# Patient Record
Sex: FEMALE | Race: BLACK OR AFRICAN AMERICAN | Employment: OTHER | URBAN - METROPOLITAN AREA
[De-identification: names, ages, dates, MRNs, and addresses within clinical notes are randomized per-mention and may not be internally consistent; named-entity substitution may affect disease eponyms.]

---

## 2021-08-05 ENCOUNTER — OFFICE VISIT (OUTPATIENT)
Dept: FAMILY MEDICINE CLINIC | Age: 66
End: 2021-08-05
Payer: COMMERCIAL

## 2021-08-05 VITALS
TEMPERATURE: 98.7 F | HEIGHT: 65 IN | HEART RATE: 77 BPM | WEIGHT: 179 LBS | BODY MASS INDEX: 29.82 KG/M2 | DIASTOLIC BLOOD PRESSURE: 75 MMHG | SYSTOLIC BLOOD PRESSURE: 124 MMHG

## 2021-08-05 DIAGNOSIS — Z00.00 ENCOUNTER FOR MEDICAL EXAMINATION TO ESTABLISH CARE: ICD-10-CM

## 2021-08-05 DIAGNOSIS — M75.41 IMPINGEMENT SYNDROME OF RIGHT SHOULDER: ICD-10-CM

## 2021-08-05 DIAGNOSIS — E11.9 TYPE 2 DIABETES MELLITUS WITHOUT COMPLICATION, WITHOUT LONG-TERM CURRENT USE OF INSULIN (HCC): ICD-10-CM

## 2021-08-05 DIAGNOSIS — I10 ESSENTIAL HYPERTENSION: Primary | ICD-10-CM

## 2021-08-05 PROBLEM — I89.0 LYMPHEDEMA OF LEFT ARM: Status: ACTIVE | Noted: 2017-02-15

## 2021-08-05 PROBLEM — C80.1 MALIGNANT NEOPLASM (HCC): Status: ACTIVE | Noted: 2021-08-05

## 2021-08-05 PROBLEM — R74.8 ELEVATED LIVER ENZYMES: Status: ACTIVE | Noted: 2017-02-15

## 2021-08-05 PROBLEM — M19.90 OSTEOARTHROSIS: Status: ACTIVE | Noted: 2021-08-05

## 2021-08-05 PROCEDURE — 99203 OFFICE O/P NEW LOW 30 MIN: CPT | Performed by: STUDENT IN AN ORGANIZED HEALTH CARE EDUCATION/TRAINING PROGRAM

## 2021-08-05 RX ORDER — METFORMIN HYDROCHLORIDE 500 MG/1
500 TABLET ORAL 2 TIMES DAILY
COMMUNITY

## 2021-08-05 RX ORDER — LOSARTAN POTASSIUM 50 MG/1
50 TABLET ORAL DAILY
COMMUNITY

## 2021-08-05 RX ORDER — LANCETS 33 GAUGE
EACH MISCELLANEOUS
COMMUNITY
Start: 2021-07-20

## 2021-08-05 RX ORDER — ATORVASTATIN CALCIUM 10 MG/1
10 TABLET, FILM COATED ORAL DAILY
COMMUNITY

## 2021-08-05 NOTE — PROGRESS NOTES
[address] [place] [address]   Office [phone]  Fax [phone]         Stefania Camp is an 61 y.o. female who presents to [facility] care. Patient was previously receiving care in [place], now moved to [place]. Currently no acute complaints     DM: Following with [facility]. On Metformin 500mg BID    HTN: On Losartan 50mg daily. R shoulder impingement syndrome: Reports pain started slowly while driving form [place], but has stayed constant since then. Aggravated by reaching above head, behind back and to the side. Evaluated by PT and would like a referral for physical therapy. Reviewed PT note from [address] [place] [address] on 6/24/21. Gyn Care  Mammogram 12/2020 - needs yearly       Current medications include:   Current Outpatient Medications   Medication Sig    metFORMIN (GLUCOPHAGE) 500 mg tablet 500 mg two (2) times a day.  atorvastatin (LIPITOR) 10 mg tablet 10 mg daily.  losartan (COZAAR) 50 mg tablet 50 mg daily.  One Touch Delica 33 gauge misc      No current facility-administered medications for this visit. Past Medical History - reviewed:  Medical history significant for:   Past Medical History:   Diagnosis Date    Cancer [facility])     breast cancer left, 2011. . S/p lumpectomy/RT/Tamoxifen x6 years    Depression     H/O colonoscopy 04/2019    Diverticulosis + benign polyps. Rpt 5 years     H/O echocardiogram 12/2020    LVEF 54-74%.  Rheumatic appearing mitral valve    H/O exercise stress test 03/2021    WNL    Mitral valve prolapse     Rheumatic fever     Sciatica     Visit for review of DEXA scan 12/2019    Neg for osteoporosis/osteopenia         Allergies - reviewed:   Not on File      Past Surgical History - reviewed:  Past Surgical History:   Procedure Laterality Date    HX HYSTERECTOMY Bilateral 2000    HX ORTHOPAEDIC Right     hip replaced 2018         Family History - reviewed:  Family History   Problem Relation Age of Onset    Hypertension Mother     Abdominal aortic aneurysm Mother     Diabetes Father     Prostate Cancer Father     Lung Cancer Father     Tuberculosis Father     Diabetes Sister     No Known Problems Brother          Social History - reviewed:  Social History     Socioeconomic History    Marital status: SINGLE     Spouse name: Not on file    Number of children: Not on file    Years of education: Not on file    Highest education level: Not on file   Occupational History    Not on file   Tobacco Use    Smoking status: Former Smoker     Packs/day: 0.50     Years: 10.00     Pack years: 5.00     Quit date: 1996     Years since quittin.6    Smokeless tobacco: Never Used   Vaping Use    Vaping Use: Never used   Substance and Sexual Activity    Alcohol use: Yes     Comment: occasionally    Drug use: Never    Sexual activity: Not on file   Other Topics Concern    Not on file   Social History Narrative    Not on file     Social Determinants of Health     Financial Resource Strain:     Difficulty of Paying Living Expenses:    Food Insecurity:     Worried About Running Out of Food in the Last Year:     [address] in the Last Year:    Transportation Needs:     Lack of Transportation (Medical):      Lack of Transportation (Non-Medical):    Physical Activity:     Days of Exercise per Week:     Minutes of Exercise per Session:    Stress:     Feeling of Stress :    Social Connections:     Frequency of Communication with Friends and Family:     Frequency of Social Gatherings with Friends and Family:     Attends Restoration Services:     Active Member of Clubs or Organizations:     Attends Club or Organization Meetings:     Marital Status:    Intimate Partner Violence:     Fear of Current or Ex-Partner:     Emotionally Abused:     Physically Abused:     Sexually Abused:          Immunizations - reviewed:   Immunization History   Administered Date(s) Administered    COVID-19, PFIZER, MRNA, LNP-S, PF, 30MCG/0.3ML DOSE 02/23/2021, 03/16/2021         Health Maintenance reviewed -  Colonoscopy 4/29/2019. Reviewed records - diverticulosis + benign polyps. Will scan into chart   Pap smear last 3 years ago  DEXA scan - no osteoporosis/osteopenia  HIV testing not done  Hepatitis C testing not done  Lung cancer screening NA      Review of Systems   Review of Systems   Respiratory: Negative for shortness of breath. Cardiovascular: Negative for chest pain and leg swelling. Gastrointestinal: Negative for abdominal pain. Musculoskeletal: Positive for joint pain. Objective:     Visit Vitals  /75 (BP 1 Location: Right arm, BP Patient Position: Sitting)   Pulse 77   Temp 98.7 °F (37.1 °C) (Oral)   Ht 5' 5\" (1.651 m)   Wt 179 lb (81.2 kg)   BMI 29.79 kg/m²       Physical Exam  General: Patient alert and oriented and in NAD  HEENT: PER/EOMI, no conjunctival pallor or scleral icterus. Heart: Regular rate and rhythm, No murmurs, rubs or gallops. 2+ peripheral pulses  Lungs: Clear to auscultation bilaterally, no wheezing, rales or rhonchi  Abd: +BS, non-tender, non-distended  Ext: No edema  Skin: No rashes or lesions noted on exposed skin,  Psych: Appropriate mood and affect   Shoulder: right  Deformity: None  ROM:  Forward Flexion: Active: 140   Passive: 180  ER (0): Active: 45   Passive: 45  IR (0): Active: Behind the body to the level L4  Abduction: Active: 120   Passive: 180  Palpation:  Subacromial TTP   Rotator Cuff Exam:  Neers sign: Positve  Neuro/Vascular:  Pulses intact, no edema, and neurologically intact      Reviewed labs: will be scanned into chart  From 2/2021  A1C 6.5   BMP wnl, including Cr 0.9  Lipid panel,       Assessment and Plan:   Assessment and Plan:  Diagnoses and all orders for this visit:    1. Essential hypertension  Assessment & Plan:  Well controlled  - Continue Losartan 50mg daily      2. Impingement syndrome of left shoulder  -     REFERRAL TO PHYSICAL THERAPY    3. Type 2 diabetes mellitus without complication, without long-term current use of insulin ([facility] [address]  Assessment & Plan:  - Following with [facility]  - Metformin 500mg BID      4. Encounter for medical examination to establish care  - Records from previous practice will be scanned into chart        Follow-up and Dispositions    · Return if symptoms worsen or fail to improve. I have discussed the aforementioned diagnoses and plan with the patient in detail. I have provided information in person and/or in AVS. All questions answered prior to discharge.      I discussed this patient with Dr. Bertrand Dave (Attending Physician)   Signed By:  Caridad Escobedo MD     Family Medicine Resident

## 2021-08-05 NOTE — PATIENT INSTRUCTIONS
Please call to schedule your appointment with provider/location then call our office back @ #[phone] to leave a message for our referral coordinator with the appointment information (date/time/providers full name) for whom you will be seeing for this referral order so that we can authorize your visit(s) with your insurance if needed and referral tracking purposes of this order.

## 2021-08-05 NOTE — PROGRESS NOTES
Sudhir Johnston is a 61 y.o. female    Chief Complaint   Patient presents with   [address] [place] [address] Care     pt referral for right shoulder. 1. Have you been to the ER, urgent care clinic since your last visit? Hospitalized since your last visit? No  2. Have you seen or consulted any other health care providers outside of the [address] [place] [address] since your last visit? Include any pap smears or colon screening.  No    Visit Vitals  /75 (BP 1 Location: Right arm, BP Patient Position: Sitting)   Pulse 77   Temp 98.7 °F (37.1 °C) (Oral)   Ht 5' 5\" (1.651 m)   Wt 179 lb (81.2 kg)   BMI 29.79 kg/m²

## 2021-08-08 PROBLEM — Z00.00 ENCOUNTER FOR MEDICAL EXAMINATION TO ESTABLISH CARE: Status: RESOLVED | Noted: 2021-08-08 | Resolved: 2021-08-08

## 2021-08-08 PROBLEM — Z00.00 ENCOUNTER FOR MEDICAL EXAMINATION TO ESTABLISH CARE: Status: ACTIVE | Noted: 2021-08-08

## 2021-08-08 PROBLEM — Z98.890 H/O COLONOSCOPY: Status: ACTIVE | Noted: 2019-04-01

## 2021-08-08 PROBLEM — E11.9 DIABETES MELLITUS (HCC): Status: RESOLVED | Noted: 2021-03-10 | Resolved: 2021-08-08

## 2021-08-10 ENCOUNTER — TELEPHONE (OUTPATIENT)
Dept: FAMILY MEDICINE CLINIC | Age: 66
End: 2021-08-10

## 2021-08-10 NOTE — TELEPHONE ENCOUNTER
----- Message from [place] sent at 8/10/2021 11:54 AM EDT -----  Regarding: Dr. Sudhir Johnson Message/Vendor Calls    Caller's first and last name:  Venus Chowdhury      Reason for call:  Requesting a call back to check status of PT referral.       Callback required yes/no and why:  yes       Best contact number(s):[phone]      Details to clarify the request: [facility]

## 2021-08-11 ENCOUNTER — TELEPHONE (OUTPATIENT)
Dept: FAMILY MEDICINE CLINIC | Age: 66
End: 2021-08-11

## 2021-08-11 NOTE — TELEPHONE ENCOUNTER
Brittany called the office to say the patient is wanting to speak with the office. Spoke with patient who wanted to know the status of the referral to PT and when it will be released. Raegan Ortiz it has been longer than 72 hours without a response from this office and she is in a lot of pain. While patient was on the phone there was a disconnect. Message sent to Ziyad Aranda, supervisor who is doing referrals at this time and it has already been sent to nurse April, also.

## 2021-08-11 NOTE — TELEPHONE ENCOUNTER
----- Message from Rodrigo Gardner sent at 8/11/2021  2:45 PM EDT -----  Regarding: Dr. Lizzeth Sow first and last name: Mamie/Nilda PT      Reason for call: order was sent with wrong shoulder      Callback required yes/no and why:      Best contact number(s):      Details to clarify the request: Requesting new order be fax #[phone] as the wrong shoulder was on order and should be RT shoulder      Rodrigo Gardner

## 2021-08-11 NOTE — TELEPHONE ENCOUNTER
Dr. Jael Umana @ 1:16 pm  Received: Today  Dorothea Lima, [address] Message/Vendor Calls     Caller's first and last name: Jane Alvarez       Reason for call:  Requesting a call back to check the status of her PT referral.  Pt has left several messages with no response.   Pt is extremely upset that she has had no response to her requests for a call back.        Callback required yes/no and why:  yes       Best contact number(s):[phone]       Details to clarify the request:  4th request for a call back. [address] [place] [address]

## 2021-08-12 NOTE — TELEPHONE ENCOUNTER
Called [employer] to double check this was taken are of. Spoke with Richie Hinkle at [employer], she states they received the corrected order last night and will reach out to the patient today to get her scheduled. Sent patient a [employer] message informing her of this.

## 2021-10-20 ENCOUNTER — OFFICE VISIT (OUTPATIENT)
Dept: FAMILY MEDICINE CLINIC | Age: 66
End: 2021-10-20
Payer: COMMERCIAL

## 2021-10-20 VITALS
OXYGEN SATURATION: 97 % | BODY MASS INDEX: 30.02 KG/M2 | WEIGHT: 180.2 LBS | HEIGHT: 65 IN | DIASTOLIC BLOOD PRESSURE: 73 MMHG | HEART RATE: 74 BPM | TEMPERATURE: 99 F | SYSTOLIC BLOOD PRESSURE: 120 MMHG

## 2021-10-20 DIAGNOSIS — E11.9 CONTROLLED TYPE 2 DIABETES MELLITUS WITHOUT COMPLICATION, WITHOUT LONG-TERM CURRENT USE OF INSULIN (HCC): ICD-10-CM

## 2021-10-20 DIAGNOSIS — Z12.31 ENCOUNTER FOR SCREENING MAMMOGRAM FOR MALIGNANT NEOPLASM OF BREAST: ICD-10-CM

## 2021-10-20 DIAGNOSIS — Z00.00 WELL WOMAN EXAM (NO GYNECOLOGICAL EXAM): Primary | ICD-10-CM

## 2021-10-20 DIAGNOSIS — Z85.3 HISTORY OF BREAST CANCER: ICD-10-CM

## 2021-10-20 PROCEDURE — 99396 PREV VISIT EST AGE 40-64: CPT | Performed by: STUDENT IN AN ORGANIZED HEALTH CARE EDUCATION/TRAINING PROGRAM

## 2021-10-20 NOTE — PROGRESS NOTES
ROUTINE ANNUAL WELL WOMAN    Subjective   Brenda Finney is a 61 y.o. female with PMH of DM, HTN, and breast cancer (in remission) who presents to clinic today for annual physical exam.    She would also like to address the following issues:  None    Sees endocrine for diabetes and recently had labs - would like referral to diabetes education for nutrition/diabetes support - discussed with endocrine as well who thought this would be a good idea. Any family history of gyn cancers (breast, ovarian or cervical ca)? Breast cancer survivor - s/p  Lumpectomy, radiation, Tamoxifen. Do you feel safe at home? yes    Diet: avoid things high in carbs, mainly plant based, does not eat beef/pork. Eats fish/chicken. Would like to lose weight. Exercise: plays pickle ball frequently. Reviewed labs: from Endocrine  From 10/7/2021  A1C 6.6   BMP  With Cr 1.04 (prior 0.9)  Lipid panel,  (from 07/2021)       Preventative care:  Colonoscopy 4/29/2019. Dr. Fleming Drivers reviewed records at last visit - diverticulosis + benign polyps. Will scan into chart   Pap smear last 3 years ago  Declines Hep C and HIV testing. Due for mammogram    Past Medical History  Past Medical History:   Diagnosis Date    Cancer [facility])     breast cancer left, 2011. . S/p lumpectomy/RT/Tamoxifen x6 years    Depression     H/O colonoscopy 04/2019    Diverticulosis + benign polyps. Rpt 5 years     H/O echocardiogram 12/2020    LVEF 54-74%. Rheumatic appearing mitral valve    H/O exercise stress test 03/2021    WNL    Mitral valve prolapse     Rheumatic fever     Sciatica     Visit for review of DEXA scan 12/2019    Neg for osteoporosis/osteopenia       Current Medications   Current Outpatient Medications on File Prior to Visit   Medication Sig Dispense Refill    metFORMIN (GLUCOPHAGE) 500 mg tablet 500 mg two (2) times a day.  atorvastatin (LIPITOR) 10 mg tablet 10 mg daily.  losartan (COZAAR) 50 mg tablet 50 mg daily.       One Touch Delica 33 gauge misc        No current facility-administered medications on file prior to visit. Surgical History  Past Surgical History:   Procedure Laterality Date    HX HYSTERECTOMY Bilateral 2000    HX ORTHOPAEDIC Right     hip replaced 2018       Family History   Family History   Problem Relation Age of Onset    Hypertension Mother     Abdominal aortic aneurysm Mother     Diabetes Father     Prostate Cancer Father     Lung Cancer Father     Tuberculosis Father     Diabetes Sister     No Known Problems Brother        Social History  Social History     Socioeconomic History    Marital status: SINGLE     Spouse name: Not on file    Number of children: Not on file    Years of education: Not on file    Highest education level: Not on file   Occupational History    Not on file   Tobacco Use    Smoking status: Former Smoker     Packs/day: 0.50     Years: 10.00     Pack years: 5.00     Quit date: 1996     Years since quittin.8    Smokeless tobacco: Never Used   Vaping Use    Vaping Use: Never used   Substance and Sexual Activity    Alcohol use: Yes     Comment: occasionally    Drug use: Never    Sexual activity: Not on file   Other Topics Concern    Not on file   Social History Narrative    Not on file     Social Determinants of Health     Financial Resource Strain:     Difficulty of Paying Living Expenses:    Food Insecurity:     Worried About Running Out of Food in the Last Year:     [address] in the Last Year:    Transportation Needs:     Lack of Transportation (Medical):      Lack of Transportation (Non-Medical):    Physical Activity:     Days of Exercise per Week:     Minutes of Exercise per Session:    Stress:     Feeling of Stress :    Social Connections:     Frequency of Communication with Friends and Family:     Frequency of Social Gatherings with Friends and Family:     Attends Orthodox Services:     Active Member of Clubs or Organizations:     Attends Club or Organization Meetings:     Marital Status:    Intimate Partner Violence:     Fear of Current or Ex-Partner:     Emotionally Abused:     Physically Abused:     Sexually Abused:          Objective   Vital Signs  Visit Vitals  /73 (BP 1 Location: Left upper arm, BP Patient Position: Sitting)   Pulse 74   Temp 99 °F (37.2 °C) (Oral)   Ht 5' 5\" (1.651 m)   Wt 180 lb 3.2 oz (81.7 kg)   SpO2 97%   BMI 29.99 kg/m²       PHYSICAL EXAM   GEN: No apparent distress. Alert and oriented and responds to all questions appropriately. EYES:  Conjunctiva clear; pupils round and reactive to light; extraocular movements are intact. EAR: External ears are normal.  Tympanic membranes are clear and without effusion. NECK:  Supple; no masses; thyroid normal           LUNGS: Respirations unlabored; clear to auscultation bilaterally  CARDIOVASCULAR: Regular, rate, and rhythm without murmurs, gallops or rubs   ABDOMEN: Soft; nontender; nondistended; normoactive bowel sounds; no masses or organomegaly  NEUROLOGIC: No focal neurologic deficits. Strength and sensation grossly intact. Coordination and gait grossly intact. EXT: Well perfused. No edema. SKIN: No obvious rashes    Assessment   Ho Brower is a 61 y.o. female presenting to clinic today for routine annual exam.    Plan       ICD-10-CM ICD-9-CM    1. Well woman exam (no gynecological exam)  Z00.00 V70.0    2. Controlled type 2 diabetes mellitus without complication, without long-term current use of insulin (HCC)  E11.9 250.00 REFERRAL TO DIABETIC EDUCATION   3. Encounter for screening mammogram for malignant neoplasm of breast  Z12.31 V76.12 [facility] MAMMO BI SCREENING INCL CAD   4. History of breast cancer  Z85.3 V10.3      - follow up mammogram  - follow up with DM educatoin  - Counseled re: diet, exercise, healthy lifestyle  - Return for yearly wellness visits    I discussed the aforementioned diagnoses with the patient as well as the plan of care. All questions were answered and an AVS was provided.      I discussed this patient with Dr. Alona Brandon (Attending Physician)      Signed By:  Moshe Pham, DO

## 2021-10-20 NOTE — PROGRESS NOTES
Last eye exam: June/July 2021  Last pap: 4-5 years ago states that it was normal   Needs referral to mammogram  Colonoscopy was done last year.     Stated that she had both of shingles vaccine, pneumococcal vaccine  Flu vaccine: 09/01/21  Stated that last time she was here she brought her medical records(which has not been scanned)  Denied a Pap exam.    Chief Complaint   Patient presents with    Complete Physical     Visit Vitals  /73 (BP 1 Location: Left upper arm, BP Patient Position: Sitting)   Pulse 74   Temp 99 °F (37.2 °C) (Oral)   Ht 5' 5\" (1.651 m)   Wt 180 lb 3.2 oz (81.7 kg)   SpO2 97%   BMI 29.99 kg/m²

## 2021-10-20 NOTE — PATIENT INSTRUCTIONS
Eating Healthy Foods: Care Instructions  Your Care Instructions     Eating healthy foods can help lower your risk for disease. Healthy food gives you energy and keeps your heart strong, your brain active, your muscles working, and your bones strong. A healthy diet includes a variety of foods from the basic food groups: grains, vegetables, fruits, milk and milk products, and meat and beans. Some people may eat more of their favorite foods from only one food group and, as a result, miss getting the nutrients they need. So, it is important to pay attention not only to what you eat but also to what you are missing from your diet. You can eat a healthy, balanced diet by making a few small changes. Follow-up care is a key part of your treatment and safety. Be sure to make and go to all appointments, and call your doctor if you are having problems. It's also a good idea to know your test results and keep a list of the medicines you take. How can you care for yourself at home? Look at what you eat  · Keep a food diary for a week or two and record everything you eat or drink. Track the number of servings you eat from each food group. · For a balanced diet every day, eat a variety of:  ? 6 or more ounce-equivalents of grains, such as cereals, breads, crackers, rice, or pasta, every day. An ounce-equivalent is 1 slice of bread, 1 cup of ready-to-eat cereal, or ½ cup of cooked rice, cooked pasta, or cooked cereal.  ? 2½ cups of vegetables, especially:  § Dark-green vegetables such as broccoli and spinach. § Orange vegetables such as carrots and sweet potatoes. § Dry beans (such as portillo and kidney beans) and peas (such as lentils). ? 2 cups of fresh, frozen, or canned fruit. A small apple or 1 banana or orange equals 1 cup. ? 3 cups of nonfat or low-fat milk, yogurt, or other milk products. ? 5½ ounces of meat and beans, such as chicken, fish, lean meat, beans, nuts, and seeds.  One egg, 1 tablespoon of peanut butter, ½ ounce nuts or seeds, or ¼ cup of cooked beans equals 1 ounce of meat. · Learn how to read food labels for serving sizes and ingredients. Fast-food and convenience-food meals often contain few or no fruits or vegetables. Make sure you eat some fruits and vegetables to make the meal more nutritious. · Look at your food diary. For each food group, add up what you have eaten and then divide the total by the number of days. This will give you an idea of how much you are eating from each food group. See if you can find some ways to change your diet to make it more healthy. Start small  · Do not try to make dramatic changes to your diet all at once. You might feel that you are missing out on your favorite foods and then be more likely to fail. · Start slowly, and gradually change your habits. Try some of the following:  ? Use whole wheat bread instead of white bread. ? Use nonfat or low-fat milk instead of whole milk. ? Eat brown rice instead of white rice, and eat whole wheat pasta instead of white-flour pasta. ? Try low-fat cheeses and low-fat yogurt. ? Add more fruits and vegetables to meals and have them for snacks. ? Add lettuce, tomato, cucumber, and onion to sandwiches. ? Add fruit to yogurt and cereal.  Enjoy food  · You can still eat your favorite foods. You just may need to eat less of them. If your favorite foods are high in fat, salt, and sugar, limit how often you eat them, but do not cut them out entirely. · Eat a wide variety of foods. Make healthy choices when eating out  · The type of restaurant you choose can help you make healthy choices. Even fast-food chains are now offering more low-fat or healthier choices on the menu. · Choose smaller portions, or take half of your meal home. · When eating out, try:  ? A veggie pizza with a whole wheat crust or grilled chicken (instead of sausage or pepperoni).   ? Pasta with roasted vegetables, grilled chicken, or marinara sauce instead of cream sauce. ? A vegetable wrap or grilled chicken wrap. ? Broiled or poached food instead of fried or breaded items. Make healthy choices easy  · Buy packaged, prewashed, ready-to-eat fresh vegetables and fruits, such as baby carrots, salad mixes, and chopped or shredded broccoli and cauliflower. · Buy packaged, presliced fruits, such as melon or pineapple. · Choose 100% fruit or vegetable juice instead of soda. Limit juice intake to 4 to 6 oz (½ to ¾ cup) a day. · Blend low-fat yogurt, fruit juice, and canned or frozen fruit to make a smoothie for breakfast or a snack. Where can you learn more? Go to http://www.naranjo.com/  Enter T756 in the search box to learn more about \"Eating Healthy Foods: Care Instructions. \"  Current as of: December 17, 2020               Content Version: 13.0  © 2006-2021 [employer]. Care instructions adapted under license by [employer] (which disclaims liability or warranty for this information). If you have questions about a medical condition or this instruction, always ask your healthcare professional. [place] [address] any warranty or liability for your use of this information.

## 2021-10-22 ENCOUNTER — CLINICAL SUPPORT (OUTPATIENT)
Dept: DIABETES SERVICES | Age: 66
End: 2021-10-22
Payer: COMMERCIAL

## 2021-10-22 DIAGNOSIS — E11.9 CONTROLLED TYPE 2 DIABETES MELLITUS WITHOUT COMPLICATION, WITHOUT LONG-TERM CURRENT USE OF INSULIN (HCC): Primary | ICD-10-CM

## 2021-10-22 PROCEDURE — G0108 DIAB MANAGE TRN  PER INDIV: HCPCS

## 2021-10-22 NOTE — PROGRESS NOTES
[employer] [facility]  Diabetes Self-Management Education & Support Program  Pre-program Assessment    Reason for Referral: Elevated blood glucose levels  Referral Source: Farshad Taylor DO  Services requested: DSMES    ASSESSMENT    From my perspective, the participant would benefit from [facility] specifically related to Reducing risks, Healthy eating, Monitoring, Taking medications, Healthy coping and Problem solving. Will adapt DSMES program to build on participant's skills score, confidence score and preparedness score as noted in the Diabetes Skills, Confidence, and Preparedness Index. During the program, we will focus on providing DSMES that specifically addresses participant's interest in Healthy eating, Monitoring, Taking medications, Healthy coping and Problem solving, as shown by their reported readiness to change. The participant would be best served by attending weekly group class series. Diabetes Self-Management Education Follow-up Visit: November 4, 2021 3-5pm       Clinical Presentation  Ho Brower is a 61 y.o.  female referred for diabetes self-management education. Participant has Type 2 DM not on insulin for 1-10 years. Family history positive for diabetes. Patient reports not receiving DSMES services in the past. Most recent A1c value: No results found for: HBA1C, [id number], [id number]    Diabetes-related medications:  Current dosing:   Key Antihyperglycemic Medications             metFORMIN (GLUCOPHAGE) 500 mg tablet 500 mg two (2) times a day. Blood Pressure Management  Key ACE/ARB Medications             losartan (COZAAR) 50 mg tablet 50 mg daily. Lipid Management  Key Antihyperlipidemia Meds             atorvastatin (LIPITOR) 10 mg tablet 10 mg daily. Clot Prevention  Key Anti-Platelet Anticoagulant Meds     The patient is on no antiplatelet meds or anticoagulants.           Learning Assessment  Learning objectives Educator assessment (10/22/2021)   Diabetes Disease Process  The participant can   A) describe diabetes in basic terms;   B) state the type of diabetes they have; &   C) state accepted blood glucose targets. Healthy Eating  The participant can   A) identify carbohydrate foods; &   B) accurately read food labels. Being Active  The participant can  A) state the benefits of physical activity;  B) report their current PA practices;  C) identify PA they would consider incorporating in their lives; &  D) develop an implementation plan. Monitoring  The participant can  A) operate their blood glucose meter; &  B) describe how they log their blood glucoses to share with their provider. Taking Medications  The participant can  A) name their diabetes medications;  B) state the purpose and dose;  C) note side effects; &  D) describe proper storage, disposal & transport (if appropriate). Healthy Coping  The participant can    A) describe their response to diabetes diagnosis; B) describe their specific coping mechanisms;  C) identify supportive people and/or other resources that positively support their diabetes self-care and health. Reducing Risks  The participant can describe the preventive measures used by providers to promote health and prevent diabetes complications. Problem Solving  The participant can   A) identify signs, symptoms & treatment of hypoglycemia;   B) identify signs, symptoms & treatment of hyperglycemia;  C) describe their sick day plan; &  D) identify BG patterns to discuss with their provider.        No  Yes  No        No  No        Yes  Yes  Yes  Yes        Yes  Yes          Yes  No  No  n/a      Yes  Yes  Yes        No          No  Yes  No  No     Characteristics to Learning   Barriers to Learning   [] Cognitive loss  [] Mental retardation   [] Intellectual delay/cognitive impairment  [] Psychiatric disorder  [] Visually impaired  [] Hearing loss                 [] Low literacy (difficulty with written text)  [] Low numeracy (difficulty with mathematical information  [] Low health literacy (difficulty with understanding health information & services  [] Language  [] Functional limitation  [] Pain   [] Financial  [] Transportation  [x] None   Favorite Ways to Learn   [] Lecture  [] Slides  [x] Reading [] Video-Internet  [] Cassettes/CDs/MP3's  [] Interactive Small Groups [] Other       Behavioral Assessment  Current self-care practices  Educator assessment (10/22/2021)   Healthy Eating  Current practices  24-hour Dietary Recall:  Breakfast: 2 slices multi grain toast, 2 boiled eggs, 2 slices bryan  Lunch: none  Dinner: grilled chicken sandwich  Snacks: potato chips  Beverages: water, cup decaf coffee with sugar free creamer, diet coke  Alcohol: pink moscato     Would benefit from DSMES related to Healthy Eating: Yes      Eats a carbohydrate controlled diet: No      Stage of change: Preparation   Being Active  Current practices  How many days during the past week have you performed physical activity where your heart beats faster and your breathing is harder than normal for 30 minutes or more? 3 days    How many days in a typical week do you perform activity such as this?  3 days     Would benefit from [facility] related to Being Active: Yes      Exercises 150 minutes/week: Yes      Stage of change: Maintenance     Monitoring  Current practices  Do you monitor your blood sugar? Yes    How often do you monitor? 1x/day    What are the range of readings? 127-215 mg/dL      Do you know your last A1c measurement? Yes    Do you know the meaning of the A1c? No     Would benefit from [facility] related to Monitoring: Yes      Uses BG readings to establish trends and understand BG patterns: No      Stage of change: Preparation   Taking Medication  Current practices  Do you understand what your diabetes medications do? No    How often do you miss doses of your diabetes medications?  Never    Can you afford your diabetes medications? Yes   Would benefit from [facility] related to Taking Medication: Yes      Takes medications consistently to receive full benefit: Yes      Stage of change: Maintenance       Healthy Coping   Current state  Diabetes Skills, Confidence and Preparedness Index: Total score: 5.0  Skills: 3.6  Confidence: 5.6  Preparedness: 6.7   Would benefit from DSMES related to Healthy Coping: Yes      Identifies specific people, organizations,etc, that actively support their diabetes self-care efforts: Yes      Stage of change: Preparation     Reducing Risks  Current state  Vaccines:  Influenza:   Immunization History   Administered Date(s) Administered    Influenza Vaccine 08/30/2012, 10/06/2015, 09/22/2016, 02/15/2018, 09/24/2019    Influenza Vaccine [employer]) PF (>6 Mo Flulaval, Fluarix, and >3 Yrs Afluria, Fluzone [id number]) 09/01/2021    Influenza, High-dose, Quadrivalent (>65 Yrs Fluzone High Dose Quad [id number]) 10/11/2018       Pneumococcal:   Immunization History   Administered Date(s) Administered    Pneumococcal Polysaccharide (PPSV-23) 10/11/2018        Hepatitis: There is no immunization history for the selected administration types on file for this patient. Examinations:  Diabetic Foot and Eye Exam HM Status   Topic Date Due    Diabetic Foot Care  Never done    Eye Exam  06/29/2023        Dental exam: Last appointment was: 3-2021    Foot exam: DUE    Heart Protection:  BP Readings from Last 2 Encounters:   10/20/21 120/73   08/05/21 124/75        No results found for: LDL, LDLC, DLDLP     Kidney Protection:  No results found for: MCACR, MCA1, MCA2, MCA3, MCAU, MCAU2, MCALPOCT     Would benefit from [facility] related to Reducing Risks: Yes      Actively participates in decision-making with provider regarding secondary prevention:  No      Stage of change: Preparation   Problem Solving  Current state  Hypoglycemia Management:  What are signs and symptoms of hypoglycemia that you experience?  Pt reported being unaware of s/s of hypoglycemia    How do you prevent hypoglycemia? Pt reported being unaware of how to prevent hypoglycemia    How do you treat hypoglycemia? drink some juice    Hyperglycemia Management:  What are signs and symptoms of hyperglycemia that you experience? light headed    How can you prevent hyperglycemia? Take medication as instructed    Sick Day Management:  What do you do differently on sick days? Pt reported being unaware of self-management on sick days    Pattern Management:  Do you notice blood glucose patterns when you look at the readings in your meter or logbook? No    How do you use the blood glucose readings from your meter or logbook? Pt reported being unaware of pattern management skills     Would benefit from [facility] related to Problem Solving: Yes      Articulates appropriate strategies to address hypoglycemia, hyperglycemia, sick day care and BG pattern: No      Stage of change: Preparation     Note: Content derived from the American Association of Diabetes Educators' Diabetes Education Curriculum: A Guide to Successful Self-Management (3rd edition)      Tyron Smiley RN on 10/22/2021 at 2:00 PM    I have personally reviewed the health record, including provider notes, laboratory data and current medications before making these care and education recommendations. The time spent in this effort is included in the total time. Total minutes: 35      Diabetes Skills, Confidence & Preparedness Index (SCPI) ©  Thank you for completing the Skills, Confidence & Preparedness Index! Below are your scores. All scales and questions are out of 7. If you would like these results emailed, please enter your email address along with some identifying patient information.   Email:    Patient Identifier:        Overall SCPI score: 5.0 Skills Score: 3.6  Low: Blood Sugar Monitoring(Q4),Problem Solving(Q6) Confidence Score: 5.6  Low: Healthy Eating(Q1),Reducing Risks(Q3),Healthy Eating(Q4),Problem SFBBOXC(R2) Preparedness Score: 6.7  Low: Healthy Coping(Q3),Reducing Risks(Q4)  Healthy Eating Score: 5.5  Low: Skills(Q1),Confidence(Q1),Confidence(Q4) Taking Medication Score: 2.0  Low: Skills(Q2) Blood Sugar Monitoring Score: 5.2  Low: ZSGXGM(G7) Reducing Risks Score: 4.5  Low: Skills(Q5)  Problem Solving Score: 4.3  Low: Skills(Q6) Healthy Coping Score: 5.3  Low: UQWILN(U0) Being Active Score: 7.0  Low: Confidence(Q5),Preparedness(Q2)    Skills/Knowledge Questions  1. I know how to plan meals that have the best balance between carbohydrates, proteins and vegetables. 5  2. I know how my diabetes medications (pills, injectables and/or insulin) work in my body. 2  3. I know when to check my blood sugar if I want to see how my body responded to a meal. 5  4. I know when to check my blood sugars to determine if my medication or insulin doses are correct. 1  5. I know what to do to prevent a low blood sugar when I exercise (either before, during, or after). 2  6. When I am sick, I know what to do differently with my diabetes management. 1  7. I know how stress can affect my diabetes management. 4  8. When I look at my blood sugars over a given week, I can explain what my blood sugar pattern is. 7  9. I know what my target levels are for A1c, blood pressure and cholesterol. 5  Confidence Questions  1. I am confident that I can plan balanced meals and snacks. 5  2. I am confident that I can manage my stress. 6  3. I am confident that I can prevent a low blood sugar during or after exercise. 5  4. I am confident that the next time I eat out, I will be able to choose foods that best keep my blood sugars in target. 5  5. I am confident I can include exercise into my schedule. 7  6. I am confident that I can use my daily blood sugars to adjust my diet, my activity, and/or my insulin. 6  7. When something out of my normal routine happens, I am confident that I can problem-solve and keep my diabetes on track.  5  Preparedness Questions  1. Within the next month, I will begin to eat more balanced meals and snacks. 8  2. Within the next month, I will choose an exercise activity and I will start fitting it into my schedule. 8  3. Within the next month, I will make a list of stress management options that work for me. 6  4. Within the next month, I will consistently plan ahead to prevent low blood sugars. 6  5. Within the next month, I will start adjusting my insulin doses on my own. 0  6. Within the next month, I will begin making changes to my diabetes management based on my daily blood sugars (eg - eating, activity and/or insulin). 7  7. Within the next month, I will begin making changes to my diabetes management to meet my overall goals (eg - eating, activity and/or insulin).  7

## 2021-11-04 ENCOUNTER — CLINICAL SUPPORT (OUTPATIENT)
Dept: DIABETES SERVICES | Age: 66
End: 2021-11-04
Payer: COMMERCIAL

## 2021-11-04 DIAGNOSIS — E11.9 CONTROLLED TYPE 2 DIABETES MELLITUS WITHOUT COMPLICATION, WITHOUT LONG-TERM CURRENT USE OF INSULIN (HCC): Primary | ICD-10-CM

## 2021-11-04 PROCEDURE — G0109 DIAB MANAGE TRN IND/GROUP: HCPCS

## 2021-11-04 NOTE — PROGRESS NOTES
[facility]  Diabetes Self-Management Education & Support Program  Encounter note    SUMMARY  Diabetes self-care management training was completed related to Reducing risks. The participant will return on November 11 to continue DSMES related to Healthy eating and Monitoring. The participant did not identify SMART Goal, and will practice knowledge and skills related to healthy eating and monitoring to improve diabetes self-management. EVALUATION:  Participant states that she is checking her blood sugars and has the readings saved in her device for review. RECOMMENDATIONS:  Encouraged the participant to continue to limit her carbohydrates at meal times. Check her blood sugars daily and log readings to share with her providers. She will need to schedule a 3 month diabetes follow up appointment. DATE DSMES TOPIC EVALUATION     11/4/2021 WHAT IS DIABETES?   a. Role of the normal pancreas in energy balance and blood glucose control   b. The defect seen in diabetes   c. Signs & symptoms of diabetes   d. Diagnosis of diabetes   e. Types of diabetes   f. Blood glucose targets in non-pregnant & non-pregnant adults       The participant knows   Their type of diabetes Yes   The basic physiologic defect Yes   Blood glucose targets Yes     DATE DSMES TOPIC EVALUATION     11/4/2021 HOW DO I STAY HEALTHY?   a. Prevention    Vaccinations    Preconception care (if applicable)  b. Examinations    Eye     Dental   Foot   c. Diabetic complications' prevention    Heart health    Kidney Health    Nerve health    Sleep health      The participant has a personal diabetes care record to keep abreast of diabetes health Yes           Pastor Drea RN on 11/4/2021 at 4:48 PM    I have personally reviewed the health record, including provider notes, laboratory data and current medications before making these care and education recommendations.  The time spent in this effort is included in the total time.   Total minutes: 70

## 2021-11-11 ENCOUNTER — CLINICAL SUPPORT (OUTPATIENT)
Dept: DIABETES SERVICES | Age: 66
End: 2021-11-11
Payer: COMMERCIAL

## 2021-11-11 DIAGNOSIS — E11.9 CONTROLLED TYPE 2 DIABETES MELLITUS WITHOUT COMPLICATION, WITHOUT LONG-TERM CURRENT USE OF INSULIN (HCC): Primary | ICD-10-CM

## 2021-11-11 PROCEDURE — G0109 DIAB MANAGE TRN IND/GROUP: HCPCS | Performed by: DIETITIAN, REGISTERED

## 2021-11-12 NOTE — PROGRESS NOTES
[facility]  Diabetes Self-Management Education & Support Program  Encounter note    SUMMARY  Diabetes self-care management training was completed related to Healthy eating and Monitoring. The participant will return on November 18 to continue DSMES related to Physical activity and Taking medications. The participant did identify SMART Goal(s): - to eat one of her meals earlier either breakfast or dinner daily, and will practice knowledge and skills related to healthy eating and monitoring to improve diabetes self-management. EVALUATION:  Pt reports she never eats breakfast but discussion reveals she is up at 5 am gets in her run and then goes to work and does not eat till 11 am; may eat again at 3 pm and then dinner is at 8 pm so she is getting in 3 meals a day. She is not eating much carb with her meals due to  food misinformation . She reports FBS are always elevated. She has never checked anywhere else in the day because she only has one strip a day to use. Discussed alternating fasting and bedtime so she can see where she is before she goes to bed from eating late. RECOMMENDATIONS:  To aim for 3 meals 4-5 hours apart- to try to have breakfast sooner in the morning so dinner will not fall so late  To alternate FBS with bedtime BS so she can see where she is from late meals     Next provider visit is scheduled for none scheduled           DATE DSMES TOPIC EVALUATION     11/12/2021 WHAT CAN I EAT?   a. General principles   b. Determining a healthy weight   c. Nutritional terms & tools    Healthy Plate method    Carbohydrate Counting    Reading food labels    Free apps   d. Pregnancy recommendations      The participant    Uses Healthy Plate principles in constructing meals No   Reads food labels in choosing acceptable foods Yes    The participant would benefit from ; adding carbs to meals.      DATE DSMES TOPIC EVALUATION     11/12/2021 HOW CAN BLOOD GLUCOSE MONITORING HELP ME?   a. Value of blood glucose monitoring   b. Realistic expectations   c. Blood glucose monitoring targets   d. Target adjustments   e. Setting a1c & blood glucose targets with provider   f. Meter selection    g. Technique for obtaining blood droplet   h. Blood glucose testing sites   i. Determining best times to test   j. Pregnancy recommendations   k. Data sharing with provider        The participant    Can demonstrate their glucometer procedure Yes   Logs their BG readings No    The participant would benefit from Uses her fadia but may not see the whole picture so encouraged to log on paper for awhile to see the pattern. Minh Curry RD , [facility] on 11/12/2021 at 7:49 AM    I have personally reviewed the health record, including provider notes, laboratory data and current medications before making these care and education recommendations. The time spent in this effort is included in the total time.   Total minutes: 120

## 2021-11-18 ENCOUNTER — CLINICAL SUPPORT (OUTPATIENT)
Dept: DIABETES SERVICES | Age: 66
End: 2021-11-18
Payer: COMMERCIAL

## 2021-11-18 DIAGNOSIS — E11.9 CONTROLLED TYPE 2 DIABETES MELLITUS WITHOUT COMPLICATION, WITHOUT LONG-TERM CURRENT USE OF INSULIN (HCC): Primary | ICD-10-CM

## 2021-11-18 PROCEDURE — G0109 DIAB MANAGE TRN IND/GROUP: HCPCS

## 2021-11-18 NOTE — PROGRESS NOTES
[employer] [facility]  Diabetes Self-Management Education & Support Program  Encounter note    SUMMARY  Diabetes self-care management training was completed related to Physical activity and Taking medications. The participant will return on December 2 to continue DSMES related to Healthy coping and Problem solving. The participant did not identify SMART Goal, and will practice knowledge and skills related to healthy eating and monitoring, being active, medications and problem solving to improve diabetes self-management. EVALUATION:  Participant states that she tried to eat her breakfast earlier in the morning but was unsuccessful due to her vomiting. She says she just can't have food on her stomach before 11am or 12 noon. She says her fasting blood sugar readings have been 147, 158, 155, 170, 169, and 188. Her dinner usually consist of non-starchy vegetables and protein but no carbohydrates. We talked about the importance of including some carbohydrates at each meal to prevent her liver from releasing stored glucose which may cause some higher fasting blood sugars. RECOMMENDATIONS:  Encouraged her to eat carbohydrates at each meal and have her dinner meal at an earlier time to see if this will help to improve her fasting blood sugar readings. She will need to schedule a 3 month diabetes follow up appointment with provider. SMART GOAL(S)  1. Eating her breakfast and dinner meals earlier in the day. ACHIEVEMENT OF GOAL(S)  [x] 0-24%     [] 25-49%     [] 50-74%     [] %     DATE DSMES TOPIC EVALUATION     11/18/2021 HOW DO MY DIABETES MEDICATIONS WORK?   a. Type 1 medications & methods    Insulin injections    Injection sites   b. Type 2 medications    Oral agents    GLP-1 agonists   c. Hypoglycemia symptoms & treatment    Glucagon emergency kits   d.  General guidance regarding insulin use whether Type 1, 2 or gestational diabetes    Storage of insulin    Disposal  Traveling with medications   e. Barriers to medication adherence      The participant    Can describe the expected action & side effects of prescribed diabetes medications Yes.  Can demonstrate injection technique (if applicable) N/A. The participant needs to address talking to her provider about her high fasting blood sugar reading. DATE DSMES TOPIC EVALUATION     11/18/2021 HOW DOES PHYSICAL ACTIVITY AFFECT MY DIABETES?   a. Benefits of physical activity   b. Beginning a program of physical activity    Walking    Pedometers    Goal setting   c. Structured physical activity program    Aerobic activity    Resistance    Flexibility    Balance   d. Physical activity program progression   e. Safety issues   f. Barriers to physical activity   g. Facilitators of physical activity        The participant has established a regular physical activity plan Yes    Participant is doing an excellent job with moving and exercising daily. Concepcion Will RN on 11/18/2021 at 4:34 PM    I have personally reviewed the health record, including provider notes, laboratory data and current medications before making these care and education recommendations. The time spent in this effort is included in the total time.   Total minutes: 80

## 2021-12-02 ENCOUNTER — CLINICAL SUPPORT (OUTPATIENT)
Dept: DIABETES SERVICES | Age: 66
End: 2021-12-02
Payer: COMMERCIAL

## 2021-12-02 DIAGNOSIS — E11.9 CONTROLLED TYPE 2 DIABETES MELLITUS WITHOUT COMPLICATION, WITHOUT LONG-TERM CURRENT USE OF INSULIN (HCC): Primary | ICD-10-CM

## 2021-12-02 PROCEDURE — G0109 DIAB MANAGE TRN IND/GROUP: HCPCS

## 2021-12-02 NOTE — PROGRESS NOTES
[facility]  Diabetes Self-Management Education & Support Program  Encounter note    SUMMARY  Diabetes self-care management training was completed related to Healthy coping and Problem solving. The participant will return on January 27 to continue DSMES related to 6 week follow up appointment. The participant did not identify SMART Goal, and will practice knowledge and skills related to healthy eating, monitoring, medications and problem solving to improve diabetes self-management. EVALUATION:  Participant is trying her best to eat breakfast at an earlier time and including carbohydrates at each meal.    RECOMMENDATIONS:  Encouraged her to talk with her provider if she is continuing to have elevated blood sugars and continue to take all of her medications as prescribed. Next provider visit is scheduled for Jan 20, 2022       SMART GOAL(S)  Eating breakfast and dinner at an earlier time. ACHIEVEMENT OF GOAL(S)  [] 0-24%     [] 25-49%     [] 50-74%     [x] %     DATE DSMES TOPIC EVALUATION     12/2/2021 HOW DO I FIND SUPPORT TO TACKLE THIS CONDITION?   a. Normal responses to diabetes diagnosis or complication    Shock    Anger & resentment    Guilt/self-blame    Sadness & worry    Depression     Anxiety    Pregnancy   b. Constructive strategies to normal responses     Exploring feelings & attitudes    Motivation: Cost versus benefits analysis    Problem-solving: Chain analysis    Obtaining support: Communicating   i. Family & friends   ii. Health team   iii. Community   c. Stress    Symptoms    Managing stress    Fill your tool kit        The participant can identify people that support them in caring for their diabetes health:  Participant has support from her daughter.     The participant would like to pursue the following in keeping themselves healthy after completing the program:  She would like to use support groups in keeping her healthy with the management of her diabetes. The participant would benefit from visiting online support groups and communicating with groups. DATE DSMES TOPIC EVALUATION     12/2/2021 HOW DO I FIGURE OUT WHAT'S INFLUENCING MY BLOOD GLUCOSES?   a. Problem solving using SOAR    Set goals    Sort options    Arrive at a plan    Reaffirm plan   b. Common problems in diabetes self-care    Hypoglycemia    Hyperglycemia    Sick days   c. Pattern management   d. Disaster preparedness plan        The participant has an action plan for    Hypoglycemia Yes   Hyperglycemia Yes   Sick days No   During disasters No    The participant would benefit from developing a disaster plan for any unforseen issues. Jenny Reynoso RN on 12/2/2021 at 4:43 PM    I have personally reviewed the health record, including provider notes, laboratory data and current medications before making these care and education recommendations. The time spent in this effort is included in the total time.   Total minutes: 120

## 2021-12-22 ENCOUNTER — HOSPITAL ENCOUNTER (OUTPATIENT)
Dept: MAMMOGRAPHY | Age: 66
Discharge: HOME | End: 2021-12-22
Payer: COMMERCIAL

## 2021-12-22 DIAGNOSIS — Z12.31 ENCOUNTER FOR SCREENING MAMMOGRAM FOR MALIGNANT NEOPLASM OF BREAST: ICD-10-CM

## 2021-12-22 PROCEDURE — 77067 SCR MAMMO BI INCL CAD: CPT

## 2021-12-22 PROCEDURE — 77063 BREAST TOMOSYNTHESIS BI: CPT

## 2022-01-12 LAB — HBA1C MFR BLD HPLC: 6.7 %

## 2022-01-26 ENCOUNTER — CLINICAL SUPPORT (OUTPATIENT)
Dept: DIABETES SERVICES | Age: 67
End: 2022-01-26
Payer: COMMERCIAL

## 2022-01-26 DIAGNOSIS — E11.9 CONTROLLED TYPE 2 DIABETES MELLITUS WITHOUT COMPLICATION, WITHOUT LONG-TERM CURRENT USE OF INSULIN (HCC): Primary | ICD-10-CM

## 2022-01-26 PROCEDURE — G0108 DIAB MANAGE TRN  PER INDIV: HCPCS

## 2022-01-26 NOTE — PROGRESS NOTES
[facility]  Diabetes Self-Management Education & Support Program  Post-program Evaluation    EVALUATION @ COMPLETION OF THE PROGRAM    Nery Self completed 8 hours of diabetes self-management education. The participant acquired new knowledge and demonstrated new skills during the program.     The participant worked on the following SMART GOAL(s) to improve their diabetes self-management:    1. Eat one of her meals earlier, either breakfast or dinner each day over the next week. ACHIEVEMENT OF GOAL(S)  [] 0-24%     [] 25-49%     [] 50-74%     [x] %  The participant's pre-program A1c was 6.6. The post-evaluation A1c is 6.7. The participant improved their Diabetes Skills, Confidence and Preparedness Index (scored out of 7): Total score: 5.5  Skills: 5.6  Confidence: 5.9  Preparedness: 5.2    FINAL RECOMMENDATIONS:  Participant states that she is doing well with her diabetes self care management. She says that she has gained 6 lbs eating 3 meals per day and will cut back to eating 2 meals per day. Next provider visit is scheduled for 6 month diabetes follow up. Milburn Lanes, RN on 2022 at 10:25 AM    Metric Patient's responses (2022) Areas for improvement     Healthy Eating       The participant is using the Healthy Plate to control carbohydrate intake Yes    The participant reads food labels accurately Yes          Being Active       The participant performs physical activity where your heart beats faster and your breathing is harder than normal for 30 minutes or more? 5 days    In a typical week, the participant performs physical activity 5 days          Monitoring   The participant is monitoring their blood sugars?  Yes    The participant is monitoring 1x/day    Blood glucoses are [id number]    The participant improved their A1c Level stayed the same    The participant understands the meaning of the A1c Yes          Taking Medications The participant understands what their diabetes medications do Yes    The participant can afford your diabetes medications Yes    The participant does not miss doses of their diabetes medications Never          Healthy Coping     The participant feels supported by family, friends and others related to their diabetes self-care practices Yes    The participant plans on utilizing the following community resources after completion of the program: ADA Nutrition        Reducing Risks   Vaccines:  Influenza:   Immunization History   Administered Date(s) Administered    Influenza Vaccine 08/30/2012, 10/06/2015, 09/22/2016, 02/15/2018, 09/24/2019    Influenza Vaccine Capton) PF (>6 Mo Flulaval, Fluarix, and >3 Yrs Afluria, Fluzone [id number]) 09/01/2021    Influenza, High-dose, Quadrivalent (>65 Yrs Fluzone High Dose Quad [id number]) 10/11/2018       Pneumococcal:   Immunization History   Administered Date(s) Administered    Pneumococcal Polysaccharide (PPSV-23) 10/11/2018        Hepatitis: There is no immunization history for the selected administration types on file for this patient. Examinations:  Diabetic Foot and Eye Exam HM Status   Topic Date Due    Diabetic Foot Care  Never done    Eye Exam  06/29/2023        Dental exam: DUE    Foot exam: DUE    Heart Protection:  BP Readings from Last 2 Encounters:   10/20/21 120/73   08/05/21 124/75        No results found for: LDL, LDLC, DLDLP     Key Anti-Platelet Anticoagulant Meds     The patient is on no antiplatelet meds or anticoagulants.            Kidney Protection:  No results found for: Maday Duncan, [facility], [address], MCAU, [address] , [facility]   The participant would benefit from additional attention to:    Vaccines:  [] Influenza  [] Pneumococcal  [] Hepatitis    Examinations:  [] Dilated eye exam  [] Dental exam  [] Foot exam    Other:  [] Reviewing long-term complications     Problem Solving   Hypoglycemia Management:  The participant knows the signs and symptoms of hypoglycemia Shaking/trembling, Dizziness/light-headedness, Sweat/clammy skin    The participant knows how to prevent hypoglycemia? Consistent meals/snack times, Take medications as instructed and Monitor blood glucose before exercise    The participant knows how to treat hypoglycemia? Rule of 15    Hyperglycemia Management:  The participant knows their signs and symptoms of hyperglycemia Extreme thirst, Frequent urination    The participant knows how to prevent hyperglycemia? Take medication as instructed, Focus on carbohydrate counting/meal planning    Sick Day Management:  The participant knows what to do differently on sick days? Stay hydrated, Check blood glucose every 2-4 hours    Pattern Management:  The participant can notice blood glucose patterns when looking at their blood glucose readings Yes           Note: Content derived from the American Association of Diabetes Educators' Diabetes Education Curriculum: A Guide to Successful Self-Management (3rd edition)      I have personally reviewed the health record, including provider notes, laboratory data and current medications before making these care and education recommendations. The time spent in this effort is included in the total time. Total minutes: 30    Diabetes Skills, Confidence & Preparedness Index (SCPI) ©  Thank you for completing the Skills, Confidence & Preparedness Index! Below are your scores. All scales and questions are out of 7. If you would like these results emailed, please enter your email address along with some identifying patient information.   Email:    Patient Identifier:        Overall SCPI score: 5.5 Skills Score: 5.6  Low: Problem Solving(Q6) Confidence Score: 5.9  Low: Problem Solving(Q7) Preparedness Score: 5.2  Low: Healthy Coping(Q3)  Healthy Eating Score: 5.8  Low: Skills(Q1) Taking Medication Score: 5.0  Low: Skills(Q2) Blood Sugar Monitoring Score: 6.0  Low: SUZDNT(B3) Reducing Risks Score: 5.8  Low: Preparedness(Q4)  Problem Solving Score: 4.0  Low: Skills(Q6) Healthy Coping Score: 5.0  Low: Preparedness(Q3) Being Active Score: 7.0  Low: Confidence(Q5),Preparedness(Q2)    Skills/Knowledge Questions  1. I know how to plan meals that have the best balance between carbohydrates, proteins and vegetables. 5  2. I know how my diabetes medications (pills, injectables and/or insulin) work in my body. 5  3. I know when to check my blood sugar if I want to see how my body responded to a meal. 7  4. I know when to check my blood sugars to determine if my medication or insulin doses are correct. 4  5. I know what to do to prevent a low blood sugar when I exercise (either before, during, or after). 6  6. When I am sick, I know what to do differently with my diabetes management. 2  7. I know how stress can affect my diabetes management. 7  8. When I look at my blood sugars over a given week, I can explain what my blood sugar pattern is. 7  9. I know what my target levels are for A1c, blood pressure and cholesterol. 7  Confidence Questions  1. I am confident that I can plan balanced meals and snacks. 6  2. I am confident that I can manage my stress. 6  3. I am confident that I can prevent a low blood sugar during or after exercise. 6  4. I am confident that the next time I eat out, I will be able to choose foods that best keep my blood sugars in target. 6  5. I am confident I can include exercise into my schedule. 7  6. I am confident that I can use my daily blood sugars to adjust my diet, my activity, and/or my insulin. 6  7. When something out of my normal routine happens, I am confident that I can problem-solve and keep my diabetes on track. 4  Preparedness Questions  1. Within the next month, I will begin to eat more balanced meals and snacks. 6  2. Within the next month, I will choose an exercise activity and I will start fitting it into my schedule. 8  3.  Within the next month, I will make a list of stress management options that work for me. 2  4. Within the next month, I will consistently plan ahead to prevent low blood sugars. 4  5. Within the next month, I will start adjusting my insulin doses on my own. 0  6. Within the next month, I will begin making changes to my diabetes management based on my daily blood sugars (eg - eating, activity and/or insulin). 6  7. Within the next month, I will begin making changes to my diabetes management to meet my overall goals (eg - eating, activity and/or insulin).  6

## 2022-03-18 PROBLEM — E11.9 CONTROLLED TYPE 2 DIABETES MELLITUS WITHOUT COMPLICATION, WITHOUT LONG-TERM CURRENT USE OF INSULIN (HCC): Status: ACTIVE | Noted: 2021-03-10

## 2022-03-18 PROBLEM — Z98.890 H/O COLONOSCOPY: Status: ACTIVE | Noted: 2019-04-01

## 2022-03-18 PROBLEM — R74.8 ELEVATED LIVER ENZYMES: Status: ACTIVE | Noted: 2017-02-15

## 2022-03-19 PROBLEM — C80.1 MALIGNANT NEOPLASM (HCC): Status: ACTIVE | Noted: 2021-08-05

## 2022-03-19 PROBLEM — M19.90 OSTEOARTHROSIS: Status: ACTIVE | Noted: 2021-08-05

## 2022-03-19 PROBLEM — I10 ESSENTIAL HYPERTENSION: Status: ACTIVE | Noted: 2017-02-03

## 2022-03-20 PROBLEM — I89.0 LYMPHEDEMA OF LEFT ARM: Status: ACTIVE | Noted: 2017-02-15

## 2022-10-13 ENCOUNTER — TRANSCRIBE ORDER (OUTPATIENT)
Dept: SCHEDULING | Age: 67
End: 2022-10-13

## 2022-10-13 DIAGNOSIS — Z12.31 SCREENING MAMMOGRAM FOR HIGH-RISK PATIENT: Primary | ICD-10-CM

## 2022-12-23 ENCOUNTER — HOSPITAL ENCOUNTER (OUTPATIENT)
Dept: MAMMOGRAPHY | Age: 67
Discharge: HOME | End: 2022-12-23
Attending: PHYSICIAN ASSISTANT
Payer: MEDICARE

## 2022-12-23 DIAGNOSIS — Z12.31 SCREENING MAMMOGRAM FOR HIGH-RISK PATIENT: ICD-10-CM

## 2022-12-23 PROCEDURE — 77063 BREAST TOMOSYNTHESIS BI: CPT

## 2023-08-04 LAB — HBA1C MFR BLD HPLC: 6.6 %

## 2023-08-24 ENCOUNTER — ANESTHESIA (OUTPATIENT)
Facility: HOSPITAL | Age: 68
End: 2023-08-24
Payer: MEDICARE

## 2023-08-24 ENCOUNTER — ANESTHESIA EVENT (OUTPATIENT)
Facility: HOSPITAL | Age: 68
End: 2023-08-24
Payer: MEDICARE

## 2023-08-24 ENCOUNTER — HOSPITAL ENCOUNTER (OUTPATIENT)
Facility: HOSPITAL | Age: 68
Setting detail: OUTPATIENT SURGERY
Discharge: HOME | End: 2023-08-24
Attending: INTERNAL MEDICINE | Admitting: INTERNAL MEDICINE
Payer: MEDICARE

## 2023-08-24 VITALS
RESPIRATION RATE: 20 BRPM | SYSTOLIC BLOOD PRESSURE: 168 MMHG | HEIGHT: 65 IN | OXYGEN SATURATION: 98 % | DIASTOLIC BLOOD PRESSURE: 78 MMHG | TEMPERATURE: 97.7 F | WEIGHT: 179.9 LBS | BODY MASS INDEX: 29.97 KG/M2 | HEART RATE: 70 BPM

## 2023-08-24 LAB
GLUCOSE BLD STRIP.AUTO-MCNC: 131 MG/DL (ref 65–117)
SERVICE CMNT-IMP: ABNORMAL

## 2023-08-24 PROCEDURE — 3600007502: Performed by: INTERNAL MEDICINE

## 2023-08-24 PROCEDURE — 2580000003 HC RX 258: Performed by: INTERNAL MEDICINE

## 2023-08-24 PROCEDURE — 7100000010 HC PHASE II RECOVERY - FIRST 15 MIN: Performed by: INTERNAL MEDICINE

## 2023-08-24 PROCEDURE — 88305 TISSUE EXAM BY PATHOLOGIST: CPT

## 2023-08-24 PROCEDURE — 6360000002 HC RX W HCPCS: Performed by: NURSE ANESTHETIST, CERTIFIED REGISTERED

## 2023-08-24 PROCEDURE — 7100000011 HC PHASE II RECOVERY - ADDTL 15 MIN: Performed by: INTERNAL MEDICINE

## 2023-08-24 PROCEDURE — 3600007512: Performed by: INTERNAL MEDICINE

## 2023-08-24 PROCEDURE — 3700000000 HC ANESTHESIA ATTENDED CARE: Performed by: INTERNAL MEDICINE

## 2023-08-24 PROCEDURE — 82962 GLUCOSE BLOOD TEST: CPT

## 2023-08-24 PROCEDURE — 3700000001 HC ADD 15 MINUTES (ANESTHESIA): Performed by: INTERNAL MEDICINE

## 2023-08-24 RX ORDER — SITAGLIPTIN AND METFORMIN HYDROCHLORIDE 1000; 50 MG/1; MG/1
TABLET, FILM COATED, EXTENDED RELEASE ORAL
COMMUNITY
Start: 2023-06-26

## 2023-08-24 RX ORDER — SODIUM CHLORIDE 0.9 % (FLUSH) 0.9 %
5-40 SYRINGE (ML) INJECTION EVERY 12 HOURS SCHEDULED
Status: DISCONTINUED | OUTPATIENT
Start: 2023-08-24 | End: 2023-08-24 | Stop reason: HOSPADM

## 2023-08-24 RX ORDER — MELOXICAM 15 MG/1
TABLET ORAL
COMMUNITY
Start: 2023-08-16

## 2023-08-24 RX ORDER — ASPIRIN 81 MG/1
1 TABLET, CHEWABLE ORAL
COMMUNITY

## 2023-08-24 RX ORDER — PROPOFOL 10 MG/ML
INJECTION, EMULSION INTRAVENOUS CONTINUOUS PRN
Status: DISCONTINUED | OUTPATIENT
Start: 2023-08-24 | End: 2023-08-24 | Stop reason: SDUPTHER

## 2023-08-24 RX ORDER — SODIUM CHLORIDE 0.9 % (FLUSH) 0.9 %
5-40 SYRINGE (ML) INJECTION PRN
Status: DISCONTINUED | OUTPATIENT
Start: 2023-08-24 | End: 2023-08-24 | Stop reason: HOSPADM

## 2023-08-24 RX ORDER — SODIUM CHLORIDE 9 MG/ML
25 INJECTION, SOLUTION INTRAVENOUS PRN
Status: DISCONTINUED | OUTPATIENT
Start: 2023-08-24 | End: 2023-08-24 | Stop reason: HOSPADM

## 2023-08-24 RX ADMIN — SODIUM CHLORIDE: 9 INJECTION, SOLUTION INTRAVENOUS at 09:45

## 2023-08-24 RX ADMIN — PROPOFOL 100 MG: 10 INJECTION, EMULSION INTRAVENOUS at 09:50

## 2023-08-24 RX ADMIN — PROPOFOL 150 MCG/KG/MIN: 10 INJECTION, EMULSION INTRAVENOUS at 09:49

## 2023-08-24 ASSESSMENT — PAIN - FUNCTIONAL ASSESSMENT: PAIN_FUNCTIONAL_ASSESSMENT: 0-10

## 2023-08-24 NOTE — DISCHARGE INSTRUCTIONS
2023    Roya Daily  :  1955  Dre Medical Record Number:  [id number]      COLONOSCOPY FINDINGS:  Your colonoscopy showed: 2 polyps and internal hemorrhoids noted. DISCOMFORT:  Redness at IV site- apply warm compress to area; if redness or soreness persist- contact your physician  There may be a slight amount of blood passed from the rectum  Gaseous discomfort- walking, belching will help relieve any discomfort  You may not operate a vehicle for 12 hours  You may not engage in an occupation involving machinery or appliances for rest of today  You may not drink alcoholic beverages for at least 12 hours  Avoid making any critical decisions for at least 24 hour  DIET:   regular   - however -  remember your colon is empty and a heavy meal will produce gas. Avoid these foods:  vegetables, fried / greasy foods, carbonated drinks for today     ACTIVITY:  You may resume your normal daily activities it is recommended that you spend the remainder of the day resting -  avoid any strenuous activity. CALL M.D. ANY SIGN OF:   Increasing pain, nausea, vomiting  Abdominal distension (swelling)  New increased bleeding (oral or rectal)  Fever (chills)  Pain in chest area  Bloody discharge from nose or mouth   Shortness of breath    Follow-up Instructions:   Call Dr. Hari Yu if any questions or problems. Telephone # [phone]  Biopsy results will be available in  5 to 7 days  Should have a repeat colonoscopy in 5 years.      -You will be set up for banding of your hemorrhoids.  If you have not heard from the office in a weeks time please call and have this set up

## 2023-08-24 NOTE — H&P
Floresita Schultz M.D.  [phone]            History and Physical       NAME:  Romario Ferreira   :   1955   MRN:   [id number]       Referring Physician:  Liz Gallagher PA-C      Consult Date: 2023 9:05 AM    Chief Complaint:  rectal bleeding    History of Present Illness:   Patient presents to office for rectal bleeding, colonoscopy  Patient states several weeks ago she had episode of rectal bleeding. She had hysterectomy 40 years ago. Notes bleeding was bright red, off and on for several hours then resolved. No diarrhea, no abdominal pain, change in bowel movement. She is diabetic. Changed from metformin to jardiance to janumet several weeks ago. Reports pcp did exam and said external hemorrhoid. She denies rectal pain. There was itching in that area  she used preparation h wipes and that helped. She denies anemia on labs checked by pcp. no bleeding since then. father had colon cancer, he was in his 46s. Last colonoscopy , 2 small polyps which were removed. recall 5 years    She personally had breast cancer in , lumpectomy and radiation, L breast. no recurrence since then. PMH:  Past Medical History:   Diagnosis Date    Breast cancer ([address])     left DCIS    Cancer ([address])     breast cancer left, . . S/p lumpectomy/RT/Tamoxifen x6 years    Depression     H/O colonoscopy 2019    Diverticulosis + benign polyps. Rpt 5 years     H/O echocardiogram 2020    LVEF 54-74%. Rheumatic appearing mitral valve    H/O exercise stress test 2021    WNL    Mitral valve prolapse     Rheumatic fever     Sciatica     Visit for review of DEXA scan 2019    Neg for osteoporosis/osteopenia       PSH:  Past Surgical History:   Procedure Laterality Date    BREAST BIOPSY Right     known cysts neg.     BREAST LUMPECTOMY Left     DCIS    HYSTERECTOMY (CERVIX STATUS UNKNOWN) Bilateral     ORTHOPEDIC SURGERY Right     hip replaced

## 2023-08-24 NOTE — ANESTHESIA POSTPROCEDURE EVALUATION
Department of Anesthesiology  Postprocedure Note    Patient: Cassidy Martinez  MRN: [id number]  YOB: 1955  Date of evaluation: 8/24/2023      Procedure Summary     Date: 08/24/23 Room / Location: [facility] ENDO 02 / [facility] ENDOSCOPY    Anesthesia Start: 0945 Anesthesia Stop: 1007    Procedures:       COLONOSCOPY (Lower GI Region)      COLONOSCOPY POLYPECTOMY SNARE/COLD BIOPSY (Lower GI Region) Diagnosis:       Rectal bleeding      Hx of colonic polyps      (Rectal bleeding [K62.5])      (Hx of colonic polyps [Z86.010])    Surgeons: Melyssa Pillai MD Responsible Provider: Maria Isabel Yan MD    Anesthesia Type: MAC ASA Status: 2          Anesthesia Type: No value filed.     Mirna Phase I: Mirna Score: 9    Mirna Phase II:        Anesthesia Post Evaluation    Patient location during evaluation: PACU  Patient participation: complete - patient participated  Level of consciousness: awake and alert  Pain score: 0  Airway patency: patent  Nausea & Vomiting: no nausea and no vomiting  Complications: no  Cardiovascular status: blood pressure returned to baseline  Respiratory status: acceptable  Hydration status: euvolemic  Pain management: adequate

## 2023-08-24 NOTE — PROGRESS NOTES
Endoscopy discharge instructions have been reviewed and given to patient. The patient verbalized understanding and acceptance of instructions. Dr. Sebastian Cantu discussed with patient and daughter procedure findings and next steps.

## 2023-08-24 NOTE — PROGRESS NOTES
Lili Case  1955  [id number]    Situation:  Verbal report received from: Rosalba ROBLEDO  Procedure: Procedure(s):  COLONOSCOPY  COLONOSCOPY POLYPECTOMY SNARE/COLD BIOPSY    Background:    Preoperative diagnosis: Rectal bleeding [K62.5]  Hx of colonic polyps [Z86.010]  Postoperative diagnosis: * No post-op diagnosis entered *    :  Dr. Greta Perez  Assistant(s): Circulator: Alia Arriaza RN  Endoscopy Technician: Adriana Jeffers    Specimens:   ID Type Source Tests Collected by Time Destination   A : ASCENDING COLON POLYP Tissue Colon-Ascending [address] [facility], MD 8/24/2023 1001    B : [address] [facility], MD 8/24/2023 1004      H. Pylori  No    Assessment:  Intra-procedure medications   Anesthesia gave intra-procedure sedation and medications, see anesthesia flow sheet Yes    Intravenous fluids: NS@ KVO     Vital signs stable yes    Abdominal assessment: round and soft  yes    Recommendation:  Discharge patient per MD order yes.   Family or Friend daughter  Permission to share finding with family or friend Yes

## 2023-08-24 NOTE — PERIOP NOTE
Initial RN admission and assessment performed and documented in Endoscopy navigator. Patient evaluated by anesthesia in pre-procedure holding. All procedural vital signs, airway assessment, and level of consciousness information monitored and recorded by anesthesia staff on the anesthesia record. Report received from [address] [place] [address] post procedure. Patient transported to recovery area by RN. Report given to post procedure RN, [facility] was pre-cleaned at bedside immediately following procedure by NITESH AT [facility].

## 2023-08-24 NOTE — PROCEDURES
Sejal Alejandro M.D.  [phone]            2023          Colonoscopy Operative Report  Enrique Whitaker  :  1955  Dre Medical Record Number:  [id number]      Indications:    Family history of coloretal cancer (screening only), Personal history of colon polyps (screening only), rectal bleeding      :  Destin Ernst MD    Assistant -- None  Implants -- None    Referring Provider: Joan Chen PA-C    Sedation:  MAC anesthesia Propofol    Pre-Procedural Exam:      Airway: clear,  No airway problems anticipated  Heart: RRR, without gallops or rubs  Lungs: clear bilaterally without wheezes, crackles, or rhonchi  Abdomen: soft, nontender, nondistended, bowel sounds present  Mental Status: awake, alert and oriented to person, place and time     Procedure Details:  After informed consent was obtained with all risks and benefits of procedure explained and preoperative exam completed, the patient was taken to the endoscopy suite and placed in the left lateral decubitus position. Upon sequential sedation as per above, a digital rectal exam was performed. The Olympus videocolonoscope  was inserted in the rectum and carefully advanced to the terminal ileum. The quality of preparation was good. The colonoscope was slowly withdrawn with careful inspection and evaluation between folds. Retroflexion in the rectum was performed. Findings:   Terminal Ileum: normal  Cecum: normal  Ascending Colon: 1  Sessile polyp(s), the largest 6 mm in size;  Transverse Colon: 1  Sessile polyp(s), the largest 5 mm in size;   Descending Colon: normal  Sigmoid: normal  Rectum: normal  Grade II internal hemorrhoids    Interventions:  2 complete polypectomy were performed using cold snare (ascending polyp) and biopsy forceps(transverse polyp) and the polyps were  retrieved    Specimen Removed:  specimen #1, 6 mm in size, located in the ascending colon removed by cold snare and retrieved

## 2023-09-26 ENCOUNTER — OFFICE VISIT (OUTPATIENT)
Dept: PRIMARY CARE CLINIC | Facility: CLINIC | Age: 68
End: 2023-09-26
Payer: MEDICARE

## 2023-09-26 VITALS
TEMPERATURE: 97.6 F | BODY MASS INDEX: 30.96 KG/M2 | HEART RATE: 82 BPM | DIASTOLIC BLOOD PRESSURE: 69 MMHG | HEIGHT: 65 IN | WEIGHT: 185.8 LBS | SYSTOLIC BLOOD PRESSURE: 133 MMHG

## 2023-09-26 DIAGNOSIS — Z00.00 MEDICARE ANNUAL WELLNESS VISIT, INITIAL: Primary | ICD-10-CM

## 2023-09-26 DIAGNOSIS — E78.2 MIXED HYPERLIPIDEMIA: ICD-10-CM

## 2023-09-26 DIAGNOSIS — Z23 ENCOUNTER FOR IMMUNIZATION: ICD-10-CM

## 2023-09-26 DIAGNOSIS — Z12.31 ENCOUNTER FOR SCREENING MAMMOGRAM FOR BREAST CANCER: ICD-10-CM

## 2023-09-26 DIAGNOSIS — G47.00 INSOMNIA, UNSPECIFIED TYPE: ICD-10-CM

## 2023-09-26 DIAGNOSIS — E11.9 CONTROLLED TYPE 2 DIABETES MELLITUS WITHOUT COMPLICATION, WITHOUT LONG-TERM CURRENT USE OF INSULIN (HCC): ICD-10-CM

## 2023-09-26 DIAGNOSIS — I10 ESSENTIAL HYPERTENSION: ICD-10-CM

## 2023-09-26 PROCEDURE — 90694 VACC AIIV4 NO PRSRV 0.5ML IM: CPT | Performed by: NURSE PRACTITIONER

## 2023-09-26 PROCEDURE — G0439 PPPS, SUBSEQ VISIT: HCPCS | Performed by: NURSE PRACTITIONER

## 2023-09-26 PROCEDURE — 3075F SYST BP GE 130 - 139MM HG: CPT | Performed by: NURSE PRACTITIONER

## 2023-09-26 PROCEDURE — 3078F DIAST BP <80 MM HG: CPT | Performed by: NURSE PRACTITIONER

## 2023-09-26 PROCEDURE — 1123F ACP DISCUSS/DSCN MKR DOCD: CPT | Performed by: NURSE PRACTITIONER

## 2023-09-26 PROCEDURE — G0008 ADMIN INFLUENZA VIRUS VAC: HCPCS | Performed by: NURSE PRACTITIONER

## 2023-09-26 PROCEDURE — 99203 OFFICE O/P NEW LOW 30 MIN: CPT | Performed by: NURSE PRACTITIONER

## 2023-09-26 SDOH — ECONOMIC STABILITY: FOOD INSECURITY: WITHIN THE PAST 12 MONTHS, YOU WORRIED THAT YOUR FOOD WOULD RUN OUT BEFORE YOU GOT MONEY TO BUY MORE.: NEVER TRUE

## 2023-09-26 SDOH — ECONOMIC STABILITY: HOUSING INSECURITY
IN THE LAST 12 MONTHS, WAS THERE A TIME WHEN YOU DID NOT HAVE A STEADY PLACE TO SLEEP OR SLEPT IN A SHELTER (INCLUDING NOW)?: NO

## 2023-09-26 SDOH — ECONOMIC STABILITY: INCOME INSECURITY: HOW HARD IS IT FOR YOU TO PAY FOR THE VERY BASICS LIKE FOOD, HOUSING, MEDICAL CARE, AND HEATING?: NOT HARD AT ALL

## 2023-09-26 SDOH — ECONOMIC STABILITY: FOOD INSECURITY: WITHIN THE PAST 12 MONTHS, THE FOOD YOU BOUGHT JUST DIDN'T LAST AND YOU DIDN'T HAVE MONEY TO GET MORE.: NEVER TRUE

## 2023-09-26 ASSESSMENT — PATIENT HEALTH QUESTIONNAIRE - PHQ9
2. FEELING DOWN, DEPRESSED OR HOPELESS: 0
SUM OF ALL RESPONSES TO PHQ QUESTIONS 1-9: 0
SUM OF ALL RESPONSES TO PHQ QUESTIONS 1-9: 0
1. LITTLE INTEREST OR PLEASURE IN DOING THINGS: 0
SUM OF ALL RESPONSES TO PHQ QUESTIONS 1-9: 0
SUM OF ALL RESPONSES TO PHQ QUESTIONS 1-9: 0
SUM OF ALL RESPONSES TO PHQ9 QUESTIONS 1 & 2: 0

## 2023-09-26 ASSESSMENT — LIFESTYLE VARIABLES
HOW MANY STANDARD DRINKS CONTAINING ALCOHOL DO YOU HAVE ON A TYPICAL DAY: PATIENT DOES NOT DRINK
HOW OFTEN DO YOU HAVE A DRINK CONTAINING ALCOHOL: NEVER

## 2023-09-26 NOTE — PROGRESS NOTES
Medicare Annual Wellness Visit    Milad Sigala is here for New Patient (Establish care pt states she would like a physical. No acute issues. ) and Medicare AWV    Assessment & Plan   Medicare annual wellness visit, initial  Controlled type 2 diabetes mellitus without complication, without long-term current use of insulin [facility])  Comments:  Sees Endocrinology. a1c stable on recent labs. She would like to switch to [address] [place] [address] for continuity of care. Essential hypertension  Comments:  BP at goal on current regimen. Mixed hyperlipidemia  Comments:  labs were recently checked by Endo; tolerating statin  Insomnia, unspecified type  Comments:  Discussed trying magnesium daily and use melatonin PRN  Encounter for immunization  -     Influenza, FLUAD, (age 72 y+), IM, PF, 0.5 mL  Encounter for screening mammogram for breast cancer  -     FARIBA DIGITAL SCREEN W OR WO CAD BILATERAL; Future  Recommendations for Preventive Services Due: see orders and patient instructions/AVS.  Recommended screening schedule for the next 5-10 years is provided to the patient in written form: see Patient Instructions/AVS.     Return in about 6 months (around 3/26/2024). Subjective   The following acute and/or chronic problems were also addressed today:  HTN: Blood pressure controlled on current regimen. Type 2 DM: She sees Endocrinology, Dr. Roddy Vallecillo. Her most recent a1c was 6.6%. She is UTD with eye exam. Also had foot exam in June. Hyperlipidemia: She is compliant with her statin, denies side effects. Patient states she has gained weight over the last year. She feels she is very active. She watches her diet, does not eat a lot of carbs. She only eats two meals a day, breakfast and early dinner. Breakfast- eggs, toast  Dinner- protein, vegetables. Insomnia: started about two years ago. She takes melatonin which does help but she does not take it daily because she does not want to rely on them.  She goes to bed at 11am

## 2023-09-26 NOTE — PROGRESS NOTES
Identified Patient with two Patient identifiers(name and ). 1. Have you been to the ER, urgent care clinic since your last visit? Hospitalized since your last visit? No    2. Have you seen or consulted any other health care providers outside of the [address] [place] [address] since your last visit? No     3. For patients aged 43-73: Has the patient had a colonoscopy / FIT/ Cologuard? Yes-No Care Gap Present    If the patient is female:    4. For patients aged 43-66: Has the patient had a mammogram within the past 2 years? Yes-No Care Gap Present      5. For patients aged 21-65: Has the patient had a pap smear? No   There were no vitals taken for this visit. Chief Complaint   Patient presents with    New Patient     Establish care pt states she would like a physical. No acute issues. Health Maintenance Due   Topic Date Due    Diabetic foot exam  Never done    Depression Screen  Never done    Diabetic Alb to Cr ratio (uACR) test  Never done    GFR test (Diabetes, CKD 3-4, OR last GFR 15-59)  Never done    Hepatitis C screen  Never done    Hepatitis B vaccine (1 of 3 - Risk 3-dose series) Never done    Shingles vaccine (2 of 2) 10/05/2017    Pneumococcal 65+ years Vaccine (2 - PCV) 10/11/2019    COVID-19 Vaccine (4 - Pfizer series) 11/15/2021    Lipids  2022    Diabetic retinal exam  2022    A1C test (Diabetic or Prediabetic)  2023    Annual Wellness Visit (AWV)  Never done    Flu vaccine (1) 2023          No questionnaires available.

## 2023-10-02 ENCOUNTER — TELEPHONE (OUTPATIENT)
Dept: PRIMARY CARE CLINIC | Facility: CLINIC | Age: 68
End: 2023-10-02

## 2023-10-02 NOTE — TELEPHONE ENCOUNTER
----- Message from Gael Whitaker sent at 10/2/2023 11:34 AM EDT -----  Subject: Referral Request    Reason for referral request? Patient still waiting on the referral for the   Skin Cancer Screening for the Moles, and Skin Tags. Provider patient wants to be referred to(if known): Mavis Martínez    Provider Phone Number(if known):     Additional Information for Provider?   ---------------------------------------------------------------------------  --------------  [address] [place]    [phone]; OK to leave message on voicemail,OK to respond with electronic   message via [employer] portal (only for patients who have registered [employer]   account)  ---------------------------------------------------------------------------  --------------

## 2023-10-13 ENCOUNTER — TELEPHONE (OUTPATIENT)
Dept: PRIMARY CARE CLINIC | Facility: CLINIC | Age: 68
End: 2023-10-13

## 2023-10-13 NOTE — TELEPHONE ENCOUNTER
----- Message from Brandidarcy Fam sent at 10/2/2023 11:34 AM EDT -----  Subject: Referral Request    Reason for referral request? Patient still waiting on the referral for the   Skin Cancer Screening for the Moles, and Skin Tags. Provider patient wants to be referred to(if known): Jefferson Ambrose    Provider Phone Number(if known):     Additional Information for Provider?   ---------------------------------------------------------------------------  --------------  [address] [place]    [phone]; OK to leave message on voicemail,OK to respond with electronic   message via [employer] portal (only for patients who have registered [employer]   account)  ---------------------------------------------------------------------------  --------------

## 2023-10-13 NOTE — TELEPHONE ENCOUNTER
Called Patients cell # gave her appointment and e-mailed via [address] [place] [address] as well, Patient was driving at the time. Faxed records to [facility] at F # [phone] and P # [phone].

## 2023-12-26 ENCOUNTER — HOSPITAL ENCOUNTER (OUTPATIENT)
Facility: HOSPITAL | Age: 68
Discharge: HOME | End: 2023-12-29
Payer: MEDICARE

## 2023-12-26 VITALS — WEIGHT: 181 LBS | BODY MASS INDEX: 30.16 KG/M2 | HEIGHT: 65 IN

## 2023-12-26 DIAGNOSIS — Z12.31 ENCOUNTER FOR SCREENING MAMMOGRAM FOR BREAST CANCER: ICD-10-CM

## 2023-12-26 PROCEDURE — 77063 BREAST TOMOSYNTHESIS BI: CPT

## 2024-02-16 ENCOUNTER — TELEMEDICINE (OUTPATIENT)
Dept: PRIMARY CARE CLINIC | Facility: CLINIC | Age: 69
End: 2024-02-16
Payer: MEDICARE

## 2024-02-16 DIAGNOSIS — R20.0 NUMBNESS: Primary | ICD-10-CM

## 2024-02-16 DIAGNOSIS — G47.9 SLEEP TROUBLE: ICD-10-CM

## 2024-02-16 DIAGNOSIS — G47.00 INSOMNIA, UNSPECIFIED TYPE: ICD-10-CM

## 2024-02-16 PROCEDURE — 1123F ACP DISCUSS/DSCN MKR DOCD: CPT | Performed by: NURSE PRACTITIONER

## 2024-02-16 PROCEDURE — 99213 OFFICE O/P EST LOW 20 MIN: CPT | Performed by: NURSE PRACTITIONER

## 2024-02-16 RX ORDER — MELOXICAM 15 MG/1
TABLET ORAL
COMMUNITY
Start: 2023-10-02

## 2024-02-16 RX ORDER — ATORVASTATIN CALCIUM 20 MG/1
TABLET, FILM COATED ORAL
COMMUNITY
Start: 2023-12-31

## 2024-02-16 RX ORDER — SITAGLIPTIN AND METFORMIN HYDROCHLORIDE 1000; 50 MG/1; MG/1
TABLET, FILM COATED, EXTENDED RELEASE ORAL EVERY 12 HOURS
COMMUNITY
Start: 2022-10-17

## 2024-02-16 NOTE — PROGRESS NOTES
Trice Lim is a 68 y.o. female who was seen via telemedicine today 2/16/2024).    Assessment & Plan:   Below is the assessment and plan developed based on review of pertinent history, physical exam, labs, studies, and medications.    1. Numbness  Comments:  to toes. she was diagnosed with neuropathy recently by podiatry. feet feel cool too. encouraged to see ortho to see if it could possibly be coming from her back? She has in office appointment next month, will reassess at that time. If still going on and pulses are weak, will consider BENOIT. We discussed different treatment options for neuropathy.   2. Insomnia, unspecified type  -     [facility] - Chandler Arreola MD, Sleep Medicine, [place]  3. Sleep trouble  -     [facility] - Chandler Arreola MD, Sleep Medicine, [place]      No follow-ups on file.    Subjective:   Trice was seen today for Leg Pain     Patient reports she is having numbness to the last three toes on her left foot and the first toe on her right foot. She also feels her feet feel cool. She went to podiatry last week and was diagnosed with \"early neuropathy\". She states they did a few different tests to confirm. No medications were discussed.  She also does have a history of low back pain.    Insomnia: patient goes to bed around 10pm-11pm every night and always wakes up 2-3 hours later. When she wakes up she feels like she is in a panic. She is unsure if she snores, lives alone. Wakes up not feeling rested.     Review of Systems   Neurological:  Positive for numbness.   Psychiatric/Behavioral:  Positive for sleep disturbance.           Objective:     There were no vitals filed for this visit.   There is no height or weight on file to calculate BMI.     Physical Exam  Constitutional:       General: She is not in acute distress.     Appearance: Normal appearance.   HENT:      Head: Normocephalic.   Pulmonary:      Effort: Pulmonary effort is normal.   Neurological:      Mental Status: She is

## 2024-04-07 ASSESSMENT — SLEEP AND FATIGUE QUESTIONNAIRES
HOW LIKELY ARE YOU TO NOD OFF OR FALL ASLEEP WHILE SITTING QUIETLY AFTER LUNCH WITHOUT ALCOHOL: WOULD NEVER DOZE
ARE YOU BOTHERED BY WAKING UP TOO EARLY AND NOT BEING ABLE TO GET BACK TO SLEEP: YES
HOW LIKELY ARE YOU TO NOD OFF OR FALL ASLEEP WHILE SITTING AND READING: SLIGHT CHANCE OF DOZING
HOW LIKELY ARE YOU TO NOD OFF OR FALL ASLEEP WHEN YOU ARE A PASSENGER IN A CAR FOR AN HOUR WITHOUT A BREAK: WOULD NEVER DOZE
HOW LIKELY ARE YOU TO NOD OFF OR FALL ASLEEP WHILE WATCHING TV: SLIGHT CHANCE OF DOZING
DO YOU GET TOO LITTLE SLEEP AT NIGHT: YES
HOW LIKELY ARE YOU TO NOD OFF OR FALL ASLEEP WHILE SITTING INACTIVE IN A PUBLIC PLACE: SLIGHT CHANCE OF DOZING
HOW LIKELY ARE YOU TO NOD OFF OR FALL ASLEEP WHILE SITTING AND TALKING TO SOMEONE: WOULD NEVER DOZE
HOW LIKELY ARE YOU TO NOD OFF OR FALL ASLEEP IN A CAR, WHILE STOPPED FOR A FEW MINUTES IN TRAFFIC: WOULD NEVER DOZE
ESS TOTAL SCORE: 4
HOW LIKELY ARE YOU TO NOD OFF OR FALL ASLEEP WHILE LYING DOWN TO REST IN THE AFTERNOON WHEN CIRCUMSTANCES PERMIT: SLIGHT CHANCE OF DOZING
SELECT ANY OF THE FOLLOWING BEHAVIORS OBSERVED WHILE PATIENT ASLEEP: LIGHT SNORING;PAUSES IN BREATHING;GRINDING TEETH
AVERAGE NUMBER OF SLEEP HOURS: 4

## 2024-04-10 ENCOUNTER — TELEMEDICINE (OUTPATIENT)
Age: 69
End: 2024-04-10
Payer: MEDICARE

## 2024-04-10 DIAGNOSIS — G47.09 OTHER INSOMNIA: Primary | ICD-10-CM

## 2024-04-10 PROCEDURE — 99203 OFFICE O/P NEW LOW 30 MIN: CPT | Performed by: SPECIALIST

## 2024-04-10 PROCEDURE — 1123F ACP DISCUSS/DSCN MKR DOCD: CPT | Performed by: SPECIALIST

## 2024-04-10 ASSESSMENT — SLEEP AND FATIGUE QUESTIONNAIRES
AVERAGE NUMBER OF SLEEP HOURS: 4
HOW LIKELY ARE YOU TO NOD OFF OR FALL ASLEEP WHILE WATCHING TV: SLIGHT CHANCE OF DOZING
DO YOU WORK SHIFTS: NO
HAS YOUR RELATIONSHIP WITH FAMILY, FRIENDS OR WORK COLLEAGUES BEEN AFFECTED BECAUSE YOU ARE SLEEPY OR TIRED: YES, MODERATE
WHAT TIME DO YOU USUALLY GO TO BED: 22:00
DO YOU TAKE NAPS: YES
DO YOU GET TOO LITTLE SLEEP AT NIGHT: YES
HOW LIKELY ARE YOU TO NOD OFF OR FALL ASLEEP IN A CAR, WHILE STOPPED FOR A FEW MINUTES IN TRAFFIC: WOULD NEVER DOZE
HOW LIKELY ARE YOU TO NOD OFF OR FALL ASLEEP WHILE LYING DOWN TO REST IN THE AFTERNOON WHEN CIRCUMSTANCES PERMIT: SLIGHT CHANCE OF DOZING
DO YOU HAVE DIFFICULTY CONCENTRATING ON THE THINGS YOU DO BECAUSE YOU ARE SLEEPY OR TIRED: YES, A LITTLE
SELECT ANY OF THE FOLLOWING BEHAVIORS OBSERVED WHILE YOU ARE ASLEEP: GRINDING TEETH
DO YOU GET TOO LITTLE SLEEP AT NIGHT: YES
DO YOU HAVE DIFFICULTY WATCHING A MOVIE OR VIDEO BECAUSE YOU BECOME SLEEPY OR TIRED: YES, A LITTLE
DO YOU HAVE DIFFICULTY OPERATING A MOTOR VEHICLE FOR LONG DISTANCES (GREATER THAN 100 MILES) BECAUSE YOU BECOME SLEEPY: NO
DO YOU HAVE DIFFICULTY OPERATING A MOTOR VEHICLE FOR SHORT DISTANCES (LESS THAN 100 MILES) BECAUSE YOU BECOME SLEEPY: NO
SELECT ANY OF THE FOLLOWING BEHAVIORS OBSERVED WHILE YOU ARE ASLEEP: LIGHT SNORING
HOW MANY NAPS DO YOU TAKE PER WEEK: 3
DO YOU HAVE DIFFICULTY BEING AS ACTIVE AS YOU WANT TO BE IN THE EVENING BECAUSE YOU ARE SLEEPY OR TIRED: YES, MODERATE
HOW LIKELY ARE YOU TO NOD OFF OR FALL ASLEEP WHILE SITTING AND TALKING TO SOMEONE: WOULD NEVER DOZE
FOSQ SCORE: 13
HOW LIKELY ARE YOU TO NOD OFF OR FALL ASLEEP WHEN YOU ARE A PASSENGER IN A CAR FOR AN HOUR WITHOUT A BREAK: WOULD NEVER DOZE
HAS YOUR MOOD BEEN AFFECTED BECAUSE YOU ARE SLEEPY OR TIRED: YES, EXTREME
ESS TOTAL SCORE: 4
HOW LONG DO YOU NAP: 10 TO 15 MINUTES
DO YOU GENERALLY HAVE DIFFICULTY REMEMBERING THINGS BECAUSE YOU ARE SLEEPY OR TIRED: YES, A LITTLE
HOW LIKELY ARE YOU TO NOD OFF OR FALL ASLEEP WHILE LYING DOWN TO REST IN THE AFTERNOON WHEN CIRCUMSTANCES PERMIT: SLIGHT CHANCE OF DOZING
HOW LIKELY ARE YOU TO NOD OFF OR FALL ASLEEP WHILE WATCHING TV: SLIGHT CHANCE OF DOZING
DO YOU HAVE DIFFICULTY BEING AS ACTIVE AS YOU WANT TO BE IN THE MORNING BECAUSE YOU ARE SLEEPY OR TIRED: YES, EXTREME
AVERAGE NUMBER OF SLEEP HOURS: 4
HOW LIKELY ARE YOU TO NOD OFF OR FALL ASLEEP WHILE SITTING QUIETLY AFTER LUNCH WITHOUT ALCOHOL: WOULD NEVER DOZE
NUMBER OF TIMES YOU WAKE PER NIGHT: 3
SELECT ANY OF THE FOLLOWING BEHAVIORS OBSERVED WHILE PATIENT ASLEEP: LIGHT SNORING;PAUSES IN BREATHING;GRINDING TEETH
HOW LIKELY ARE YOU TO NOD OFF OR FALL ASLEEP WHEN YOU ARE A PASSENGER IN A CAR FOR AN HOUR WITHOUT A BREAK: WOULD NEVER DOZE
HOW LIKELY ARE YOU TO NOD OFF OR FALL ASLEEP WHILE SITTING AND READING: SLIGHT CHANCE OF DOZING
HOW LIKELY ARE YOU TO NOD OFF OR FALL ASLEEP WHILE SITTING INACTIVE IN A PUBLIC PLACE: SLIGHT CHANCE OF DOZING
HOW LIKELY ARE YOU TO NOD OFF OR FALL ASLEEP WHILE SITTING QUIETLY AFTER LUNCH WITHOUT ALCOHOL: WOULD NEVER DOZE
SELECT ANY OF THE FOLLOWING BEHAVIORS OBSERVED WHILE YOU ARE ASLEEP: PAUSES IN BREATHING
ARE YOU BOTHERED BY WAKING UP TOO EARLY AND NOT BEING ABLE TO GET BACK TO SLEEP: YES
HOW LIKELY ARE YOU TO NOD OFF OR FALL ASLEEP WHILE SITTING INACTIVE IN A PUBLIC PLACE: SLIGHT CHANCE OF DOZING
HOW LIKELY ARE YOU TO NOD OFF OR FALL ASLEEP WHILE SITTING AND TALKING TO SOMEONE: WOULD NEVER DOZE
DO YOU HAVE DIFFICULTY VISITING YOUR FAMILY OR FRIENDS IN THEIR HOME BECAUSE YOU BECOME SLEEPY OR TIRED: YES, A LITTLE
DO YOU HAVE PROBLEMS WITH FREQUENT AWAKENINGS AT NIGHT: YES
HOW LIKELY ARE YOU TO NOD OFF OR FALL ASLEEP IN A CAR, WHILE STOPPED FOR A FEW MINUTES IN TRAFFIC: WOULD NEVER DOZE
ARE YOU BOTHERED BY WAKING UP TOO EARLY AND NOT BEING ABLE TO GET BACK TO SLEEP: YES
HOW LIKELY ARE YOU TO NOD OFF OR FALL ASLEEP WHILE SITTING AND READING: SLIGHT CHANCE OF DOZING

## 2024-04-10 NOTE — PROGRESS NOTES
[facility] - [address]  [facility]  [address]  [place] [facility] [address]  Dept: [phone]           [address]  [place] [address]  Tel.  [phone]  Fax. [phone] [address]  [place] [address]  Tel.  [phone]  Fax. [phone] [address] [place] [address]  Tel.  [phone]  Fax. [phone]   Trice Lim is a 68 y.o. female who was seen by synchronous (real-time) audio-video technology on 4/10/2024.      Consent:  She and/or her healthcare decision maker is aware that this patient-initiated Telehealth encounter is a billable service, with coverage as determined by her insurance carrier. She is aware that she may receive a bill and has provided verbal consent to proceed: Yes    I was at home while conducting this encounter.    Patient in [place]    Chief Complaint       Chief Complaint   Patient presents with    Sleep Problem     NP ref'd by Angeli Ellis for Insomnia       HPI      Trice Lim is 68 y.o. female seen for evaluation of a sleep disorder.     The patient reports she has experienced difficulty with sleep maintenance, more prominent during the past 9 months.  In general, retires between 10: 30-11 PM; may often awaken at 3: 30-4 AM.  Cannot easily return to sleep at that time.  May not begin to fall asleep until 6-7 AM.      She notes that this is her first time living on first floor.      Often naps at 1: 30-2 PM for about 30 minutes.  If she is able to do this then she does feel better during the remainder of the day.      If she does get out of bed at night she will often feel more fatigued the following day; may nap.      The patient has not undergone diagnostic testing for the current problems.             4/10/2024    11:59 AM 4/7/2024     7:13 PM   Sleep Medicine   Sitting and reading 1 1   Watching TV 1 1   Sitting, inactive in a public place (e.g. a

## 2024-08-15 ENCOUNTER — TELEPHONE (OUTPATIENT)
Dept: PRIMARY CARE CLINIC | Facility: CLINIC | Age: 69
End: 2024-08-15

## 2024-08-15 NOTE — TELEPHONE ENCOUNTER
----- Message from Hal HUNTER sent at 8/15/2024 10:20 AM EDT -----  Regarding: E[facility] Appointment Request  E[facility] Appointment Request    Patient needs appointment for ECC Appointment Type: Existing Condition Follow Up.    Patient Requested Dates(s): first week in sept  Patient Requested Time:any time  Provider Name:Angeli Ellis, NIKITA - RONNY    Reason for Appointment Request: Established Patient - Available appointments did not meet patient need  --------------------------------------------------------------------------------------------------------------------------    Relationship to Patient: Self     Call Back Information: OK to leave message on voicemail  Preferred Call Back Number: Phone [phone]

## 2024-09-16 SDOH — HEALTH STABILITY: PHYSICAL HEALTH: ON AVERAGE, HOW MANY MINUTES DO YOU ENGAGE IN EXERCISE AT THIS LEVEL?: 90 MIN

## 2024-09-16 SDOH — HEALTH STABILITY: PHYSICAL HEALTH: ON AVERAGE, HOW MANY DAYS PER WEEK DO YOU ENGAGE IN MODERATE TO STRENUOUS EXERCISE (LIKE A BRISK WALK)?: 4 DAYS

## 2024-09-16 ASSESSMENT — PATIENT HEALTH QUESTIONNAIRE - PHQ9
SUM OF ALL RESPONSES TO PHQ QUESTIONS 1-9: 2
SUM OF ALL RESPONSES TO PHQ9 QUESTIONS 1 & 2: 2
SUM OF ALL RESPONSES TO PHQ QUESTIONS 1-9: 2
1. LITTLE INTEREST OR PLEASURE IN DOING THINGS: SEVERAL DAYS
2. FEELING DOWN, DEPRESSED OR HOPELESS: SEVERAL DAYS

## 2024-09-16 ASSESSMENT — LIFESTYLE VARIABLES
HOW MANY STANDARD DRINKS CONTAINING ALCOHOL DO YOU HAVE ON A TYPICAL DAY: 1 OR 2
HOW MANY STANDARD DRINKS CONTAINING ALCOHOL DO YOU HAVE ON A TYPICAL DAY: 1
HOW OFTEN DO YOU HAVE A DRINK CONTAINING ALCOHOL: 2-4 TIMES A MONTH
HOW OFTEN DO YOU HAVE A DRINK CONTAINING ALCOHOL: 3
HOW OFTEN DO YOU HAVE SIX OR MORE DRINKS ON ONE OCCASION: 1

## 2024-09-17 ENCOUNTER — OFFICE VISIT (OUTPATIENT)
Dept: PRIMARY CARE CLINIC | Facility: CLINIC | Age: 69
End: 2024-09-17
Payer: MEDICARE

## 2024-09-17 VITALS
WEIGHT: 183.4 LBS | TEMPERATURE: 98.6 F | HEIGHT: 65 IN | DIASTOLIC BLOOD PRESSURE: 85 MMHG | BODY MASS INDEX: 30.56 KG/M2 | HEART RATE: 78 BPM | SYSTOLIC BLOOD PRESSURE: 141 MMHG

## 2024-09-17 DIAGNOSIS — Z23 ENCOUNTER FOR IMMUNIZATION: ICD-10-CM

## 2024-09-17 DIAGNOSIS — T78.3XXD ANGIOEDEMA, SUBSEQUENT ENCOUNTER: ICD-10-CM

## 2024-09-17 DIAGNOSIS — Z00.00 MEDICARE ANNUAL WELLNESS VISIT, SUBSEQUENT: Primary | ICD-10-CM

## 2024-09-17 LAB — HBA1C MFR BLD HPLC: 6.4 %

## 2024-09-17 PROCEDURE — 1123F ACP DISCUSS/DSCN MKR DOCD: CPT | Performed by: FAMILY MEDICINE

## 2024-09-17 PROCEDURE — 90653 IIV ADJUVANT VACCINE IM: CPT | Performed by: FAMILY MEDICINE

## 2024-09-17 PROCEDURE — G0008 ADMIN INFLUENZA VIRUS VAC: HCPCS | Performed by: FAMILY MEDICINE

## 2024-09-17 PROCEDURE — 3077F SYST BP >= 140 MM HG: CPT | Performed by: FAMILY MEDICINE

## 2024-09-17 PROCEDURE — 3079F DIAST BP 80-89 MM HG: CPT | Performed by: FAMILY MEDICINE

## 2024-09-17 PROCEDURE — G0439 PPPS, SUBSEQ VISIT: HCPCS | Performed by: FAMILY MEDICINE

## 2024-11-18 ENCOUNTER — OFFICE VISIT (OUTPATIENT)
Dept: PRIMARY CARE CLINIC | Facility: CLINIC | Age: 69
End: 2024-11-18

## 2024-11-18 VITALS
BODY MASS INDEX: 29.99 KG/M2 | WEIGHT: 180 LBS | SYSTOLIC BLOOD PRESSURE: 122 MMHG | OXYGEN SATURATION: 97 % | TEMPERATURE: 98 F | HEART RATE: 90 BPM | DIASTOLIC BLOOD PRESSURE: 80 MMHG | HEIGHT: 65 IN

## 2024-11-18 DIAGNOSIS — Z85.3 HISTORY OF BREAST CANCER: ICD-10-CM

## 2024-11-18 DIAGNOSIS — Z23 ENCOUNTER FOR IMMUNIZATION: ICD-10-CM

## 2024-11-18 DIAGNOSIS — E11.9 CONTROLLED TYPE 2 DIABETES MELLITUS WITHOUT COMPLICATION, WITHOUT LONG-TERM CURRENT USE OF INSULIN (HCC): Primary | ICD-10-CM

## 2024-11-18 PROBLEM — G89.29 OTHER CHRONIC PAIN: Status: ACTIVE | Noted: 2024-11-18

## 2024-11-18 PROBLEM — E04.1 THYROID NODULE: Status: ACTIVE | Noted: 2024-11-18

## 2024-11-18 PROBLEM — C80.1 MALIGNANT NEOPLASM (HCC): Status: RESOLVED | Noted: 2021-08-05 | Resolved: 2024-11-18

## 2024-11-18 PROBLEM — M22.2X2 PATELLOFEMORAL PAIN SYNDROME OF LEFT KNEE: Status: ACTIVE | Noted: 2024-11-18

## 2024-11-18 PROBLEM — F32.0 CURRENT MILD EPISODE OF MAJOR DEPRESSIVE DISORDER WITHOUT PRIOR EPISODE (HCC): Status: ACTIVE | Noted: 2023-03-16

## 2024-11-18 PROBLEM — E03.8 TSH (THYROID-STIMULATING HORMONE DEFICIENCY): Status: ACTIVE | Noted: 2024-11-18

## 2024-11-18 PROBLEM — G47.00 INSOMNIA: Status: ACTIVE | Noted: 2024-11-18

## 2024-11-18 PROBLEM — E78.5 HYPERLIPIDEMIA: Status: ACTIVE | Noted: 2023-03-16

## 2024-11-18 RX ORDER — MELOXICAM 15 MG/1
15 TABLET ORAL
COMMUNITY
Start: 2024-10-01

## 2024-11-18 RX ORDER — LANCETS 33 GAUGE
EACH MISCELLANEOUS
COMMUNITY
Start: 2024-08-20

## 2024-11-18 RX ORDER — BLOOD-GLUCOSE METER
EACH MISCELLANEOUS
COMMUNITY

## 2024-11-18 RX ORDER — BLOOD SUGAR DIAGNOSTIC
STRIP MISCELLANEOUS
COMMUNITY
Start: 2024-08-20

## 2024-11-18 RX ORDER — SITAGLIPTIN AND METFORMIN HYDROCHLORIDE 1000; 50 MG/1; MG/1
1 TABLET, FILM COATED, EXTENDED RELEASE ORAL 2 TIMES DAILY
COMMUNITY

## 2024-11-18 SDOH — ECONOMIC STABILITY: FOOD INSECURITY: WITHIN THE PAST 12 MONTHS, THE FOOD YOU BOUGHT JUST DIDN'T LAST AND YOU DIDN'T HAVE MONEY TO GET MORE.: NEVER TRUE

## 2024-11-18 SDOH — ECONOMIC STABILITY: FOOD INSECURITY: WITHIN THE PAST 12 MONTHS, YOU WORRIED THAT YOUR FOOD WOULD RUN OUT BEFORE YOU GOT MONEY TO BUY MORE.: NEVER TRUE

## 2024-11-18 SDOH — ECONOMIC STABILITY: INCOME INSECURITY: HOW HARD IS IT FOR YOU TO PAY FOR THE VERY BASICS LIKE FOOD, HOUSING, MEDICAL CARE, AND HEATING?: NOT HARD AT ALL

## 2024-11-18 SDOH — ECONOMIC STABILITY: TRANSPORTATION INSECURITY
IN THE PAST 12 MONTHS, HAS LACK OF TRANSPORTATION KEPT YOU FROM MEETINGS, WORK, OR FROM GETTING THINGS NEEDED FOR DAILY LIVING?: NO

## 2024-11-18 ASSESSMENT — ENCOUNTER SYMPTOMS
ABDOMINAL PAIN: 0
CONSTIPATION: 0
DIARRHEA: 0
SHORTNESS OF BREATH: 0

## 2024-11-18 ASSESSMENT — PATIENT HEALTH QUESTIONNAIRE - PHQ9
SUM OF ALL RESPONSES TO PHQ9 QUESTIONS 1 & 2: 2
1. LITTLE INTEREST OR PLEASURE IN DOING THINGS: SEVERAL DAYS
SUM OF ALL RESPONSES TO PHQ QUESTIONS 1-9: 2
2. FEELING DOWN, DEPRESSED OR HOPELESS: SEVERAL DAYS
SUM OF ALL RESPONSES TO PHQ QUESTIONS 1-9: 2

## 2024-11-18 NOTE — PROGRESS NOTES
PCP: Angeli Ellis, APRN - CNP    CC: Other (Patient states she would like to get a referral through us to change her endocrinologist. )      Subjective      Trice Lim is a 69 y.o. female,Established patient, who presents for endocrine referral.     T2DM: Patient currently sees Dr. Fierro for endocrinology for her diabetes. Only on Janumet  mg BID.  Last A1c 6.4% on 9/17/2024.  Endorses compliance with medications.  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  She is wanting to switch to a different endocrinologist after hearing a diabetes talk at the [facility], and hearing about the endocrinology office closer to her home.  She is interested in also seeing the dietitian at the office as well    Review of Systems   Constitutional:  Negative for chills, fever and unexpected weight change.   HENT: Negative.     Eyes:  Negative for visual disturbance.   Respiratory:  Negative for shortness of breath.    Gastrointestinal:  Negative for abdominal pain, constipation and diarrhea.   Genitourinary: Negative.    Musculoskeletal: Negative.    Skin:  Negative for rash.   Neurological:  Negative for dizziness and headaches.   Psychiatric/Behavioral:  Negative for dysphoric mood and suicidal ideas.          Objective      Vitals:    11/18/24 1519   BP: 122/80   Site: Right Upper Arm   Position: Sitting   Cuff Size: Medium Adult   Pulse: 90   Temp: 98 °F (36.7 °C)   TempSrc: Oral   SpO2: 97%   Weight: 81.6 kg (180 lb)   Height: 1.651 m (5' 5\")      Body mass index is 29.95 kg/m².       PHQ-9 Total Score: 2 (11/18/2024  3:22 PM)      Physical Exam  HENT:      Head: Normocephalic.      Right Ear: External ear normal.      Left Ear: External ear normal.      Nose: Nose normal.   Eyes:      Extraocular Movements: Extraocular movements intact.      Conjunctiva/sclera: Conjunctivae normal.      Pupils: Pupils are equal, round, and reactive to light.

## 2024-11-18 NOTE — ASSESSMENT & PLAN NOTE
-S/p radiation x 6 weeks, and lumpectomy of the left breast.  Patient was on tamoxifen from [id number]

## 2024-11-18 NOTE — ASSESSMENT & PLAN NOTE
-Well controlled with A1c of 6.4%  -Continue Janumet  mg twice daily  -Referral placed to Dr. Cueva per patient's request  -Encourage continue exercise regimen  -Remainder of diabetic labs to be completed by new endocrinologist

## 2024-11-18 NOTE — PROGRESS NOTES
\"Have you been to the ER, urgent care clinic since your last visit?  Hospitalized since your last visit?\"    NO    “Have you seen or consulted any other health care providers outside our system since your last visit?”    NO      “Have you had a diabetic eye exam?”    NO appt 01/19/2025    Date of last diabetic eye exam: 6/29/2021

## 2024-12-04 LAB — HBA1C MFR BLD HPLC: 6.5 %

## 2025-01-13 ENCOUNTER — HOSPITAL ENCOUNTER (OUTPATIENT)
Facility: HOSPITAL | Age: 70
Discharge: HOME | End: 2025-01-16
Payer: MEDICARE

## 2025-01-13 DIAGNOSIS — Z12.31 VISIT FOR SCREENING MAMMOGRAM: ICD-10-CM

## 2025-01-13 PROCEDURE — 77063 BREAST TOMOSYNTHESIS BI: CPT

## 2025-02-16 SDOH — ECONOMIC STABILITY: INCOME INSECURITY: IN THE LAST 12 MONTHS, WAS THERE A TIME WHEN YOU WERE NOT ABLE TO PAY THE MORTGAGE OR RENT ON TIME?: NO

## 2025-02-16 SDOH — ECONOMIC STABILITY: FOOD INSECURITY: WITHIN THE PAST 12 MONTHS, YOU WORRIED THAT YOUR FOOD WOULD RUN OUT BEFORE YOU GOT MONEY TO BUY MORE.: NEVER TRUE

## 2025-02-16 SDOH — ECONOMIC STABILITY: TRANSPORTATION INSECURITY
IN THE PAST 12 MONTHS, HAS THE LACK OF TRANSPORTATION KEPT YOU FROM MEDICAL APPOINTMENTS OR FROM GETTING MEDICATIONS?: NO

## 2025-02-16 SDOH — ECONOMIC STABILITY: FOOD INSECURITY: WITHIN THE PAST 12 MONTHS, THE FOOD YOU BOUGHT JUST DIDN'T LAST AND YOU DIDN'T HAVE MONEY TO GET MORE.: NEVER TRUE

## 2025-02-17 ENCOUNTER — TELEMEDICINE (OUTPATIENT)
Dept: PRIMARY CARE CLINIC | Facility: CLINIC | Age: 70
End: 2025-02-17

## 2025-02-17 DIAGNOSIS — G47.00 INSOMNIA, UNSPECIFIED TYPE: ICD-10-CM

## 2025-02-17 DIAGNOSIS — R00.2 PALPITATIONS: ICD-10-CM

## 2025-02-17 DIAGNOSIS — E11.9 CONTROLLED TYPE 2 DIABETES MELLITUS WITHOUT COMPLICATION, WITHOUT LONG-TERM CURRENT USE OF INSULIN (HCC): Primary | ICD-10-CM

## 2025-02-17 DIAGNOSIS — G47.9 SLEEP TROUBLE: ICD-10-CM

## 2025-02-17 DIAGNOSIS — Z85.3 HISTORY OF BREAST CANCER: ICD-10-CM

## 2025-02-17 NOTE — PROGRESS NOTES
Trice Lim is a 69 y.o. female who was seen via telemedicine today 2/17/2025).    Assessment & Plan:   Below is the assessment and plan developed based on review of pertinent history, physical exam, labs, studies, and medications.    1. Controlled type 2 diabetes mellitus without complication, without long-term current use of insulin (HCC)  Assessment & Plan:   Monitored by specialist- no acute findings meriting change in the plan  2. History of breast cancer  Comments:  Needs regular follow up with cardiology d/t radiation exposure. has not established care yet in [place].  Orders:  -     Malgorzata Hartley MD, Cardiology, [place]  3. Sleep trouble  Comments:  Patient prefers testing for sleep apnea in sleep lab, not at home.  Orders:  -     Twila Linton MD, Sleep Medicine, [place]  4. Insomnia, unspecified type  -     Twila Linton MD, Sleep Medicine, [place]  5. Palpitations  -     Malgorzata Hartley MD, Cardiology, [place]  -     Twila Linton MD, Sleep Medicine, [place]      No follow-ups on file.    Subjective:   Trice was seen today for Other (Wants to get referral for cardio.)     Patient reports she had radiation in 2011 to left breast and was told she needed regular follow ups with cardiology since then.  She was seeing cardiology when she lived in [place], but has not seen anyone since she moved to [place]. She denies symptoms. Last seen in 2022.    Patient reports she goes to sleep at 10pm. She wakes up around 1am. Falls back asleep. Repeats this cycle all night. Unsure if she snores, she sleeps alone. She does not wake up rested. Has daytime fatigue. Has palpitations at night. She was referred to sleep medicine last year but reports she did not have sleep study. She would like one.     Review of Systems   Cardiovascular:  Positive for palpitations.   Psychiatric/Behavioral:  Positive for sleep disturbance.           Objective:     There were no vitals filed for

## 2025-02-18 ENCOUNTER — OFFICE VISIT (OUTPATIENT)
Age: 70
End: 2025-02-18
Payer: MEDICARE

## 2025-02-18 VITALS
SYSTOLIC BLOOD PRESSURE: 119 MMHG | BODY MASS INDEX: 30.82 KG/M2 | DIASTOLIC BLOOD PRESSURE: 78 MMHG | HEIGHT: 65 IN | OXYGEN SATURATION: 94 % | HEART RATE: 86 BPM | TEMPERATURE: 98.6 F | WEIGHT: 185 LBS

## 2025-02-18 DIAGNOSIS — E53.8 B12 DEFICIENCY: ICD-10-CM

## 2025-02-18 DIAGNOSIS — E78.2 MIXED HYPERLIPIDEMIA: ICD-10-CM

## 2025-02-18 DIAGNOSIS — E11.65 TYPE 2 DIABETES MELLITUS WITH HYPERGLYCEMIA, WITHOUT LONG-TERM CURRENT USE OF INSULIN (HCC): Primary | ICD-10-CM

## 2025-02-18 PROCEDURE — 3078F DIAST BP <80 MM HG: CPT | Performed by: INTERNAL MEDICINE

## 2025-02-18 PROCEDURE — 3074F SYST BP LT 130 MM HG: CPT | Performed by: INTERNAL MEDICINE

## 2025-02-18 PROCEDURE — 99204 OFFICE O/P NEW MOD 45 MIN: CPT | Performed by: INTERNAL MEDICINE

## 2025-02-18 NOTE — PROGRESS NOTES
Trice Lim is a 69 y.o. female here for   Chief Complaint   Patient presents with    New Patient    Diabetes       1. Have you been to the ER, urgent care clinic since your last visit?  Hospitalized since your last visit? - No    2. Have you seen or consulted any other health care providers outside of the [facility] since your last visit?  Include any pap smears or colon screening.- No

## 2025-02-18 NOTE — PROGRESS NOTES
Consultation - Diabetes     PCP: Angeli Ellis APRN - CNP    Chief Complaint   Patient presents with    New Patient    Diabetes     HPI:  Trice Lim is a 69 y.o. female with  has a past medical history of Breast cancer (HCC), Cancer (HCC), Depression, Diabetes mellitus (HCC), H/O colonoscopy, H/O echocardiogram, H/O exercise stress test, Hyperlipidemia, Hypertension, Malignant neoplasm (HCC), Mitral valve prolapse, Rheumatic fever, Sciatica, and Visit for review of DEXA scan. who is seen in consultation at the request of Angeli Ellis APRN - CNP for evaluation of diabetes.     Dx w/t2dm 10 years ago   No prior hospitalizations for dm   Hx of MVP 2/2 rheumatic fever   NL stress test 2021   No known microvascular disease     Current dm medds:  Janument 50/1000 BID     A1C 6.5% 12/2024  Checking 2x/week - usually 150 PP     Full ROS completed this visit:  +weight gain  Negative for  weight loss, fevers, chills, blurry vision, changes in vision, chest pain, palpitations, leg swelling, shortness of breath, wheezing, snoring, abdominal pain, nausea, vomiting, diarrhea, constipation, frequent urination, dysuria, tremors, fainting, shakes, cold intolerance, hot intolerance, foot sores, rash.    LABS/STUDIES:     No results found for: \"EYJ4MEYI\"    Lab Results   Component Value Date/Time    KUUT1VNFJ 6.4 09/17/2024 12:00 AM       No results found for: \"CHOL\", \"TRIG\", \"HDL\"    No results found for: \"TSH\"    No results found for: \"CPEPLT\", \"CPEPTIDE\"    Current Outpatient Medications   Medication Sig Dispense Refill    MAGNESIUM GLYCINATE PO Take by mouth      SITagliptin-metFORMIN (JANUMET XR)  MG TB24 per extended release tablet Take 1 tablet by mouth in the morning and at bedtime Pt says she is receiving this medication through a assistance program [employer].      ONETOUCH VERIO strip       Lancets (ONETOUCH DELICA PLUS [id number]) MISC       Blood Glucose Monitoring Suppl (ONETOUCH VERIO) w/Device KIT Dx:

## 2025-02-25 ENCOUNTER — OFFICE VISIT (OUTPATIENT)
Age: 70
End: 2025-02-25
Payer: MEDICARE

## 2025-02-25 VITALS
SYSTOLIC BLOOD PRESSURE: 122 MMHG | HEIGHT: 65 IN | HEART RATE: 90 BPM | WEIGHT: 180 LBS | DIASTOLIC BLOOD PRESSURE: 72 MMHG | OXYGEN SATURATION: 94 % | BODY MASS INDEX: 29.99 KG/M2

## 2025-02-25 DIAGNOSIS — E78.2 MIXED DYSLIPIDEMIA: ICD-10-CM

## 2025-02-25 DIAGNOSIS — R06.02 SHORTNESS OF BREATH: ICD-10-CM

## 2025-02-25 DIAGNOSIS — I10 ESSENTIAL HYPERTENSION: Primary | ICD-10-CM

## 2025-02-25 PROCEDURE — 93005 ELECTROCARDIOGRAM TRACING: CPT | Performed by: SPECIALIST

## 2025-02-25 PROCEDURE — 99204 OFFICE O/P NEW MOD 45 MIN: CPT | Performed by: SPECIALIST

## 2025-02-25 NOTE — PATIENT INSTRUCTIONS
Patient Education        Learning About the Mediterranean Diet  What is the Mediterranean diet?     The Mediterranean diet is a style of eating rather than a diet plan. It features foods eaten in [place], [place], southern [place] and [place], and other countries along the [place] Sea. It emphasizes eating foods like fish, fruits, vegetables, beans, high-fiber breads and whole grains, nuts, and olive oil. This style of eating includes limited red meat, cheese, and sweets.  Why choose the Mediterranean diet?  A Mediterranean-style diet may improve heart health. It contains more fat than other heart-healthy diets. But the fats are mainly from nuts, unsaturated oils (such as fish oils and olive oil), and certain nut or seed oils (such as canola, soybean, or flaxseed oil). These fats may help protect the heart and blood vessels.  How can you get started on the Mediterranean diet?  Here are some things you can do to switch to a more Mediterranean way of eating.  What to eat  Eat a variety of fruits and vegetables each day, such as grapes, blueberries, tomatoes, broccoli, peppers, figs, olives, spinach, eggplant, beans, lentils, and chickpeas.  Eat a variety of whole-grain foods each day, such as oats, brown rice, and whole wheat bread, pasta, and couscous.  Eat fish at least 2 times a week. Try tuna, salmon, mackerel, lake trout, herring, or sardines.  Eat moderate amounts of low-fat dairy products, such as milk, cheese, or yogurt.  Eat moderate amounts of poultry and eggs.  Choose healthy (unsaturated) fats, such as nuts, olive oil, and certain nut or seed oils like canola, soybean, and flaxseed.  Limit unhealthy (saturated) fats, such as butter, palm oil, and coconut oil. And limit fats found in animal products, such as meat and dairy products made with whole milk. Try to eat red meat only a few times a month in very small amounts.  Limit sweets and desserts to only a few times a week. This includes sugar-sweetened

## 2025-02-25 NOTE — PROGRESS NOTES
Chief Complaint   Patient presents with    Palpitations    Other     Hx Breast Cancer      Vitals:    02/25/25 1114   BP: 122/72   Site: Right Upper Arm   Position: Sitting   Pulse: 90   SpO2: 94%   Weight: 81.6 kg (180 lb)   Height: 1.651 m (5' 5\")         Chest pain: DENIED     Recent hospital stays: DENIED     Refills: DENIED     Patient states that when she bends to tie her left shoe, she gets a ' grinding pain' in her chest.   
-- class 1 [employer]     HTN  - BP OK on losartan -- cont     Dyslipidemia   - cont statin - prior lipids OK     Phone FU testing.  See me in 1 year       Retired (finance).   with 1 child.  Moved from [place] ([place]) in 2021.  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  [place], [place]  [address]  [place] [address]  Ph: [phone]   Ph [phone]

## 2025-04-08 ENCOUNTER — ANCILLARY PROCEDURE (OUTPATIENT)
Age: 70
End: 2025-04-08
Payer: MEDICARE

## 2025-04-08 ENCOUNTER — RESULTS FOLLOW-UP (OUTPATIENT)
Age: 70
End: 2025-04-08

## 2025-04-08 VITALS
DIASTOLIC BLOOD PRESSURE: 80 MMHG | BODY MASS INDEX: 29.99 KG/M2 | HEIGHT: 65 IN | HEART RATE: 98 BPM | SYSTOLIC BLOOD PRESSURE: 122 MMHG | WEIGHT: 180 LBS

## 2025-04-08 VITALS
HEART RATE: 92 BPM | DIASTOLIC BLOOD PRESSURE: 80 MMHG | WEIGHT: 180 LBS | SYSTOLIC BLOOD PRESSURE: 122 MMHG | BODY MASS INDEX: 29.99 KG/M2 | HEIGHT: 65 IN

## 2025-04-08 DIAGNOSIS — R06.02 SHORTNESS OF BREATH: ICD-10-CM

## 2025-04-08 LAB
ECHO AO ASC DIAM: 3.3 CM
ECHO AO ASCENDING AORTA INDEX: 1.75 CM/M2
ECHO AO ROOT DIAM: 3 CM
ECHO AO ROOT INDEX: 1.59 CM/M2
ECHO AV AREA PEAK VELOCITY: 3.5 CM2
ECHO AV AREA VTI: 3.9 CM2
ECHO AV AREA/BSA PEAK VELOCITY: 1.9 CM2/M2
ECHO AV AREA/BSA VTI: 2.1 CM2/M2
ECHO AV MEAN GRADIENT: 4 MMHG
ECHO AV MEAN VELOCITY: 0.9 M/S
ECHO AV PEAK GRADIENT: 6 MMHG
ECHO AV PEAK VELOCITY: 1.3 M/S
ECHO AV VELOCITY RATIO: 0.92
ECHO AV VTI: 19.6 CM
ECHO BSA: 1.93 M2
ECHO EST RA PRESSURE: 3 MMHG
ECHO LA DIAMETER INDEX: 2.06 CM/M2
ECHO LA DIAMETER: 3.9 CM
ECHO LA TO AORTIC ROOT RATIO: 1.3
ECHO LA VOL A-L A2C: 52 ML (ref 22–52)
ECHO LA VOL A-L A4C: 28 ML (ref 22–52)
ECHO LA VOL MOD A2C: 48 ML (ref 22–52)
ECHO LA VOL MOD A4C: 26 ML (ref 22–52)
ECHO LA VOLUME AREA LENGTH: 43 ML
ECHO LA VOLUME INDEX A-L A2C: 28 ML/M2 (ref 16–34)
ECHO LA VOLUME INDEX A-L A4C: 15 ML/M2 (ref 16–34)
ECHO LA VOLUME INDEX AREA LENGTH: 23 ML/M2 (ref 16–34)
ECHO LA VOLUME INDEX MOD A2C: 25 ML/M2 (ref 16–34)
ECHO LA VOLUME INDEX MOD A4C: 14 ML/M2 (ref 16–34)
ECHO LV E' LATERAL VELOCITY: 5.67 CM/S
ECHO LV E' SEPTAL VELOCITY: 6.85 CM/S
ECHO LV EDV A2C: 86 ML
ECHO LV EDV A4C: 78 ML
ECHO LV EDV BP: 82 ML (ref 56–104)
ECHO LV EDV INDEX A4C: 41 ML/M2
ECHO LV EDV INDEX BP: 43 ML/M2
ECHO LV EDV NDEX A2C: 46 ML/M2
ECHO LV EF PHYSICIAN: 65 %
ECHO LV EJECTION FRACTION A2C: 63 %
ECHO LV EJECTION FRACTION A4C: 63 %
ECHO LV ESV A2C: 32 ML
ECHO LV ESV A4C: 29 ML
ECHO LV ESV BP: 31 ML (ref 19–49)
ECHO LV ESV INDEX A2C: 17 ML/M2
ECHO LV ESV INDEX A4C: 15 ML/M2
ECHO LV ESV INDEX BP: 16 ML/M2
ECHO LV FRACTIONAL SHORTENING: 36 % (ref 28–44)
ECHO LV INTERNAL DIMENSION DIASTOLE INDEX: 1.9 CM/M2
ECHO LV INTERNAL DIMENSION DIASTOLIC: 3.6 CM (ref 3.9–5.3)
ECHO LV INTERNAL DIMENSION SYSTOLIC INDEX: 1.22 CM/M2
ECHO LV INTERNAL DIMENSION SYSTOLIC: 2.3 CM
ECHO LV IVSD: 1 CM (ref 0.6–0.9)
ECHO LV MASS 2D: 107.9 G (ref 67–162)
ECHO LV MASS INDEX 2D: 57.1 G/M2 (ref 43–95)
ECHO LV POSTERIOR WALL DIASTOLIC: 1 CM (ref 0.6–0.9)
ECHO LV RELATIVE WALL THICKNESS RATIO: 0.56
ECHO LVOT AREA: 3.8 CM2
ECHO LVOT AV VTI INDEX: 1.06
ECHO LVOT DIAM: 2.2 CM
ECHO LVOT MEAN GRADIENT: 3 MMHG
ECHO LVOT PEAK GRADIENT: 6 MMHG
ECHO LVOT PEAK VELOCITY: 1.2 M/S
ECHO LVOT STROKE VOLUME INDEX: 41.8 ML/M2
ECHO LVOT SV: 79 ML
ECHO LVOT VTI: 20.8 CM
ECHO MV A VELOCITY: 0.66 M/S
ECHO MV AREA PHT: 3.2 CM2
ECHO MV AREA VTI: 5.7 CM2
ECHO MV E DECELERATION TIME (DT): 238.2 MS
ECHO MV E VELOCITY: 0.48 M/S
ECHO MV E/A RATIO: 0.73
ECHO MV E/E' LATERAL: 8.47
ECHO MV E/E' RATIO (AVERAGED): 7.74
ECHO MV E/E' SEPTAL: 7.01
ECHO MV LVOT VTI INDEX: 0.66
ECHO MV MAX VELOCITY: 0.7 M/S
ECHO MV MEAN GRADIENT: 1 MMHG
ECHO MV MEAN VELOCITY: 0.5 M/S
ECHO MV PEAK GRADIENT: 2 MMHG
ECHO MV PRESSURE HALF TIME (PHT): 69.1 MS
ECHO MV VTI: 13.8 CM
ECHO RA AREA 4C: 18.3 CM2
ECHO RA END SYSTOLIC VOLUME APICAL 4 CHAMBER INDEX BSA: 23 ML/M2
ECHO RA VOLUME: 44 ML
ECHO RV FREE WALL PEAK S': 12.7 CM/S
ECHO RV INTERNAL DIMENSION: 4.1 CM
ECHO RV TAPSE: 2.7 CM (ref 1.7–?)

## 2025-04-08 PROCEDURE — 93306 TTE W/DOPPLER COMPLETE: CPT | Performed by: SPECIALIST

## 2025-04-08 PROCEDURE — 93017 CV STRESS TEST TRACING ONLY: CPT | Performed by: SPECIALIST

## 2025-04-10 LAB
ECHO BSA: 1.93 M2
STRESS ANGINA INDEX: 0
STRESS BASELINE DIAS BP: 78 MMHG
STRESS BASELINE HR: 90 BPM
STRESS BASELINE SYS BP: 152 MMHG
STRESS ESTIMATED WORKLOAD: 7 METS
STRESS EXERCISE DUR MIN: 5 MIN
STRESS EXERCISE DUR SEC: 0 SEC
STRESS O2 SAT PEAK: 97 %
STRESS O2 SAT REST: 97 %
STRESS PEAK DIAS BP: 80 MMHG
STRESS PEAK SYS BP: 204 MMHG
STRESS PERCENT HR ACHIEVED: 113 %
STRESS POST PEAK HR: 173 BPM
STRESS RATE PRESSURE PRODUCT: NORMAL BPM*MMHG
STRESS SR DUKE TREADMILL SCORE: 5
STRESS ST DEPRESSION: 0 MM
STRESS TARGET HR: 153 BPM

## 2025-04-10 PROCEDURE — 93018 CV STRESS TEST I&R ONLY: CPT | Performed by: SPECIALIST

## 2025-04-10 PROCEDURE — 93016 CV STRESS TEST SUPVJ ONLY: CPT | Performed by: SPECIALIST

## 2025-04-14 SDOH — ECONOMIC STABILITY: INCOME INSECURITY: IN THE LAST 12 MONTHS, WAS THERE A TIME WHEN YOU WERE NOT ABLE TO PAY THE MORTGAGE OR RENT ON TIME?: NO

## 2025-04-14 SDOH — ECONOMIC STABILITY: FOOD INSECURITY: WITHIN THE PAST 12 MONTHS, THE FOOD YOU BOUGHT JUST DIDN'T LAST AND YOU DIDN'T HAVE MONEY TO GET MORE.: NEVER TRUE

## 2025-04-14 SDOH — ECONOMIC STABILITY: FOOD INSECURITY: WITHIN THE PAST 12 MONTHS, YOU WORRIED THAT YOUR FOOD WOULD RUN OUT BEFORE YOU GOT MONEY TO BUY MORE.: NEVER TRUE

## 2025-04-16 ENCOUNTER — OFFICE VISIT (OUTPATIENT)
Dept: PRIMARY CARE CLINIC | Facility: CLINIC | Age: 70
End: 2025-04-16
Payer: MEDICARE

## 2025-04-16 VITALS
SYSTOLIC BLOOD PRESSURE: 129 MMHG | HEART RATE: 82 BPM | HEIGHT: 65 IN | BODY MASS INDEX: 30.32 KG/M2 | WEIGHT: 182 LBS | TEMPERATURE: 98.5 F | RESPIRATION RATE: 16 BRPM | DIASTOLIC BLOOD PRESSURE: 76 MMHG | OXYGEN SATURATION: 99 %

## 2025-04-16 DIAGNOSIS — I89.0 LYMPHEDEMA OF LEFT ARM: Primary | ICD-10-CM

## 2025-04-16 PROCEDURE — 1159F MED LIST DOCD IN RCRD: CPT | Performed by: NURSE PRACTITIONER

## 2025-04-16 PROCEDURE — 1123F ACP DISCUSS/DSCN MKR DOCD: CPT | Performed by: NURSE PRACTITIONER

## 2025-04-16 PROCEDURE — 3074F SYST BP LT 130 MM HG: CPT | Performed by: NURSE PRACTITIONER

## 2025-04-16 PROCEDURE — 3078F DIAST BP <80 MM HG: CPT | Performed by: NURSE PRACTITIONER

## 2025-04-16 PROCEDURE — 1160F RVW MEDS BY RX/DR IN RCRD: CPT | Performed by: NURSE PRACTITIONER

## 2025-04-16 PROCEDURE — 99213 OFFICE O/P EST LOW 20 MIN: CPT | Performed by: NURSE PRACTITIONER

## 2025-04-16 SDOH — ECONOMIC STABILITY: FOOD INSECURITY: WITHIN THE PAST 12 MONTHS, YOU WORRIED THAT YOUR FOOD WOULD RUN OUT BEFORE YOU GOT MONEY TO BUY MORE.: NEVER TRUE

## 2025-04-16 SDOH — ECONOMIC STABILITY: FOOD INSECURITY: WITHIN THE PAST 12 MONTHS, THE FOOD YOU BOUGHT JUST DIDN'T LAST AND YOU DIDN'T HAVE MONEY TO GET MORE.: NEVER TRUE

## 2025-04-16 ASSESSMENT — PATIENT HEALTH QUESTIONNAIRE - PHQ9
10. IF YOU CHECKED OFF ANY PROBLEMS, HOW DIFFICULT HAVE THESE PROBLEMS MADE IT FOR YOU TO DO YOUR WORK, TAKE CARE OF THINGS AT HOME, OR GET ALONG WITH OTHER PEOPLE: NOT DIFFICULT AT ALL
5. POOR APPETITE OR OVEREATING: NOT AT ALL
7. TROUBLE CONCENTRATING ON THINGS, SUCH AS READING THE NEWSPAPER OR WATCHING TELEVISION: NOT AT ALL
SUM OF ALL RESPONSES TO PHQ QUESTIONS 1-9: 0
2. FEELING DOWN, DEPRESSED OR HOPELESS: NOT AT ALL
4. FEELING TIRED OR HAVING LITTLE ENERGY: NOT AT ALL
3. TROUBLE FALLING OR STAYING ASLEEP: NOT AT ALL
1. LITTLE INTEREST OR PLEASURE IN DOING THINGS: NOT AT ALL
8. MOVING OR SPEAKING SO SLOWLY THAT OTHER PEOPLE COULD HAVE NOTICED. OR THE OPPOSITE, BEING SO FIGETY OR RESTLESS THAT YOU HAVE BEEN MOVING AROUND A LOT MORE THAN USUAL: NOT AT ALL
9. THOUGHTS THAT YOU WOULD BE BETTER OFF DEAD, OR OF HURTING YOURSELF: NOT AT ALL
6. FEELING BAD ABOUT YOURSELF - OR THAT YOU ARE A FAILURE OR HAVE LET YOURSELF OR YOUR FAMILY DOWN: NOT AT ALL
SUM OF ALL RESPONSES TO PHQ QUESTIONS 1-9: 0

## 2025-04-16 ASSESSMENT — ENCOUNTER SYMPTOMS: SHORTNESS OF BREATH: 0

## 2025-04-16 NOTE — PROGRESS NOTES
\"Have you been to the ER, urgent care clinic since your last visit?  Hospitalized since your last visit?\"    NO    “Have you seen or consulted any other health care providers outside our system since your last visit?”    NO           
03/16/2023    Osteoarthrosis 08/05/2021    Controlled type 2 diabetes mellitus without complication, without long-term current use of insulin 03/10/2021    H/O colonoscopy 04/01/2019    Elevated liver enzymes 02/15/2017    Lymphedema of left arm 02/15/2017    Essential hypertension 02/03/2017        No Known Allergies  Past Medical History:   Diagnosis Date    Breast cancer 2011    left DCIS    Cancer (HCC)     breast cancer left, 2011.. S/p lumpectomy/RT/Tamoxifen x6 years    Depression     Diabetes mellitus (HCC)     H/O colonoscopy 04/2019    Diverticulosis + benign polyps. Rpt 5 years     H/O echocardiogram 12/2020    LVEF 54-74%. Rheumatic appearing mitral valve    H/O exercise stress test 03/2021    WNL    Hyperlipidemia     Hypertension     Malignant neoplasm (HCC) 08/05/2021    Formatting of this note might be different from the original.  Left breast cancer         Mitral valve prolapse     Rheumatic fever     Sciatica     Visit for review of DEXA scan 12/2019    Neg for osteoporosis/osteopenia     Past Surgical History:   Procedure Laterality Date    BREAST BIOPSY Right     known cysts neg.    BREAST LUMPECTOMY Left 2011    DCIS    COLONOSCOPY N/A 08/24/2023    COLONOSCOPY performed by Farzad White MD at [facility] ENDOSCOPY    COLONOSCOPY N/A 08/24/2023    COLONOSCOPY POLYPECTOMY SNARE/COLD BIOPSY performed by Farzad White MD at [facility] ENDOSCOPY    HYSTERECTOMY (CERVIX STATUS UNKNOWN) Bilateral 2000    JOINT REPLACEMENT  2018    Rt Hip    ORTHOPEDIC SURGERY Right     hip replaced 2018    PARTIAL HYSTERECTOMY (CERVIX NOT REMOVED)  2000     Family History   Problem Relation Age of Onset    Diabetes Father     Prostate Cancer Father     Lung Cancer Father     Tuberculosis Father     Hypertension Mother     Abdominal aortic aneurysm Mother     Heart Disease Mother     No Known Problems Brother     Diabetes Sister      Social History     Tobacco Use    Smoking status: Former     Types: Cigarettes     Start date: 11/1997

## 2025-05-28 ENCOUNTER — OFFICE VISIT (OUTPATIENT)
Dept: PRIMARY CARE CLINIC | Facility: CLINIC | Age: 70
End: 2025-05-28
Payer: MEDICARE

## 2025-05-28 VITALS
RESPIRATION RATE: 16 BRPM | OXYGEN SATURATION: 98 % | HEART RATE: 79 BPM | HEIGHT: 65 IN | WEIGHT: 184 LBS | DIASTOLIC BLOOD PRESSURE: 86 MMHG | BODY MASS INDEX: 30.66 KG/M2 | SYSTOLIC BLOOD PRESSURE: 144 MMHG

## 2025-05-28 DIAGNOSIS — T14.8XXA BRUISING: ICD-10-CM

## 2025-05-28 DIAGNOSIS — R42 DIZZINESS: Primary | ICD-10-CM

## 2025-05-28 PROCEDURE — 3077F SYST BP >= 140 MM HG: CPT | Performed by: NURSE PRACTITIONER

## 2025-05-28 PROCEDURE — 99214 OFFICE O/P EST MOD 30 MIN: CPT | Performed by: NURSE PRACTITIONER

## 2025-05-28 PROCEDURE — 1159F MED LIST DOCD IN RCRD: CPT | Performed by: NURSE PRACTITIONER

## 2025-05-28 PROCEDURE — 1123F ACP DISCUSS/DSCN MKR DOCD: CPT | Performed by: NURSE PRACTITIONER

## 2025-05-28 PROCEDURE — 3079F DIAST BP 80-89 MM HG: CPT | Performed by: NURSE PRACTITIONER

## 2025-05-28 PROCEDURE — 1160F RVW MEDS BY RX/DR IN RCRD: CPT | Performed by: NURSE PRACTITIONER

## 2025-05-28 RX ORDER — MECLIZINE HYDROCHLORIDE 25 MG/1
25 TABLET ORAL 3 TIMES DAILY PRN
Qty: 15 TABLET | Refills: 0 | Status: SHIPPED | OUTPATIENT
Start: 2025-05-28 | End: 2025-06-07

## 2025-05-28 ASSESSMENT — ENCOUNTER SYMPTOMS: COLOR CHANGE: 1
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
## 2025-05-28 NOTE — PROGRESS NOTES
Have you been to the ER, urgent care clinic since your last visit?  Hospitalized since your last visit?   NO    Have you seen or consulted any other health care providers outside our system since your last visit?   NO    Chief Complaint   Patient presents with    Fall     4x this past week dizzy spells      BP (!) 144/86 (BP Site: Right Upper Arm, Patient Position: Sitting, BP Cuff Size: Large Adult)   Pulse 79   Resp 16   Ht 1.651 m (5' 5\")   Wt 83.5 kg (184 lb)   SpO2 98%   BMI 30.62 kg/m²

## 2025-06-04 ENCOUNTER — RESULTS FOLLOW-UP (OUTPATIENT)
Dept: PRIMARY CARE CLINIC | Facility: CLINIC | Age: 70
End: 2025-06-04

## 2025-06-04 ENCOUNTER — HOSPITAL ENCOUNTER (OUTPATIENT)
Facility: HOSPITAL | Age: 70
Discharge: HOME | End: 2025-06-07
Payer: MEDICARE

## 2025-06-04 PROCEDURE — 70450 CT HEAD/BRAIN W/O DYE: CPT

## 2025-06-11 ENCOUNTER — LAB (OUTPATIENT)
Age: 70
End: 2025-06-11

## 2025-06-11 DIAGNOSIS — E11.65 TYPE 2 DIABETES MELLITUS WITH HYPERGLYCEMIA, WITHOUT LONG-TERM CURRENT USE OF INSULIN (HCC): ICD-10-CM

## 2025-06-11 LAB
ALBUMIN SERPL-MCNC: 3.7 G/DL (ref 3.5–5)
ALBUMIN/GLOB SERPL: 1.2 (ref 1.1–2.2)
ALP SERPL-CCNC: 98 U/L (ref 45–117)
ALT SERPL-CCNC: 30 U/L (ref 12–78)
ANION GAP SERPL CALC-SCNC: 7 MMOL/L (ref 2–12)
AST SERPL-CCNC: 18 U/L (ref 15–37)
BILIRUB SERPL-MCNC: 0.6 MG/DL (ref 0.2–1)
BUN SERPL-MCNC: 19 MG/DL (ref 6–20)
BUN/CREAT SERPL: 18 (ref 12–20)
CALCIUM SERPL-MCNC: 9.3 MG/DL (ref 8.5–10.1)
CHLORIDE SERPL-SCNC: 105 MMOL/L (ref 97–108)
CO2 SERPL-SCNC: 27 MMOL/L (ref 21–32)
CREAT SERPL-MCNC: 1.03 MG/DL (ref 0.55–1.02)
EST. AVERAGE GLUCOSE BLD GHB EST-MCNC: 126 MG/DL
GLOBULIN SER CALC-MCNC: 3.2 G/DL (ref 2–4)
GLUCOSE SERPL-MCNC: 150 MG/DL (ref 65–100)
HBA1C MFR BLD: 6 % (ref 4–5.6)
POTASSIUM SERPL-SCNC: 4.5 MMOL/L (ref 3.5–5.1)
PROT SERPL-MCNC: 6.9 G/DL (ref 6.4–8.2)
SODIUM SERPL-SCNC: 139 MMOL/L (ref 136–145)

## 2025-06-14 ENCOUNTER — RESULTS FOLLOW-UP (OUTPATIENT)
Age: 70
End: 2025-06-14

## 2025-06-18 ENCOUNTER — OFFICE VISIT (OUTPATIENT)
Age: 70
End: 2025-06-18
Payer: MEDICARE

## 2025-06-18 VITALS
TEMPERATURE: 98 F | WEIGHT: 178.2 LBS | HEART RATE: 95 BPM | HEIGHT: 65 IN | SYSTOLIC BLOOD PRESSURE: 136 MMHG | DIASTOLIC BLOOD PRESSURE: 84 MMHG | BODY MASS INDEX: 29.69 KG/M2 | OXYGEN SATURATION: 98 %

## 2025-06-18 DIAGNOSIS — I10 ESSENTIAL HYPERTENSION: ICD-10-CM

## 2025-06-18 DIAGNOSIS — E78.2 MIXED HYPERLIPIDEMIA: ICD-10-CM

## 2025-06-18 DIAGNOSIS — I34.1 MVP (MITRAL VALVE PROLAPSE): ICD-10-CM

## 2025-06-18 DIAGNOSIS — R42 DIZZINESS: ICD-10-CM

## 2025-06-18 DIAGNOSIS — E11.65 TYPE 2 DIABETES MELLITUS WITH HYPERGLYCEMIA, WITHOUT LONG-TERM CURRENT USE OF INSULIN (HCC): Primary | ICD-10-CM

## 2025-06-18 DIAGNOSIS — N18.2 STAGE 2 CHRONIC KIDNEY DISEASE: ICD-10-CM

## 2025-06-18 DIAGNOSIS — E53.8 B12 DEFICIENCY: ICD-10-CM

## 2025-06-18 PROCEDURE — 1160F RVW MEDS BY RX/DR IN RCRD: CPT | Performed by: INTERNAL MEDICINE

## 2025-06-18 PROCEDURE — 1123F ACP DISCUSS/DSCN MKR DOCD: CPT | Performed by: INTERNAL MEDICINE

## 2025-06-18 PROCEDURE — 3079F DIAST BP 80-89 MM HG: CPT | Performed by: INTERNAL MEDICINE

## 2025-06-18 PROCEDURE — 1126F AMNT PAIN NOTED NONE PRSNT: CPT | Performed by: INTERNAL MEDICINE

## 2025-06-18 PROCEDURE — 1159F MED LIST DOCD IN RCRD: CPT | Performed by: INTERNAL MEDICINE

## 2025-06-18 PROCEDURE — 99215 OFFICE O/P EST HI 40 MIN: CPT | Performed by: INTERNAL MEDICINE

## 2025-06-18 PROCEDURE — 3044F HG A1C LEVEL LT 7.0%: CPT | Performed by: INTERNAL MEDICINE

## 2025-06-18 PROCEDURE — 3075F SYST BP GE 130 - 139MM HG: CPT | Performed by: INTERNAL MEDICINE

## 2025-06-18 RX ORDER — HYDROCHLOROTHIAZIDE 12.5 MG/1
CAPSULE ORAL
Qty: 6 EACH | Refills: 3 | Status: CANCELLED | OUTPATIENT
Start: 2025-06-18

## 2025-06-18 RX ORDER — HYDROCHLOROTHIAZIDE 12.5 MG/1
CAPSULE ORAL
Qty: 3 EACH | Refills: 5 | Status: SHIPPED | OUTPATIENT
Start: 2025-06-18

## 2025-06-18 RX ORDER — BLOOD SUGAR DIAGNOSTIC
STRIP MISCELLANEOUS
Qty: 300 EACH | Refills: 3 | Status: SHIPPED | OUTPATIENT
Start: 2025-06-18

## 2025-06-18 RX ORDER — BLOOD-GLUCOSE METER
EACH MISCELLANEOUS
Qty: 1 KIT | Refills: 0 | Status: SHIPPED | OUTPATIENT
Start: 2025-06-18

## 2025-06-18 RX ORDER — AVOBENZONE, HOMOSALATE, OCTISALATE, OCTOCRYLENE 30; 40; 45; 26 MG/ML; MG/ML; MG/ML; MG/ML
CREAM TOPICAL
Qty: 300 EACH | Refills: 3 | Status: SHIPPED | OUTPATIENT
Start: 2025-06-18

## 2025-06-18 NOTE — PROGRESS NOTES
Trice Lim is a 69 y.o. female here for   Chief Complaint   Patient presents with    Diabetes       1. Have you been to the ER, urgent care clinic since your last visit?  Hospitalized since your last visit? -no    2. Have you seen or consulted any other health care providers outside of the [facility] since your last visit?  Include any pap smears or colon screening.-no      
diabetes mellitus with hyperglycemia, without long-term current use of insulin ([facility])  E11.65 Blood Glucose Monitoring Suppl (ONETOUCH VERIO FLEX SYSTEM) w/Device KIT     blood glucose test strips (ONETOUCH VERIO) strip     Lancets MISC     Continuous Glucose Sensor (FREESTYLE OUMAR 3 PLUS SENSOR) MISC      2. Dizziness  R42 Cortisol AM, Total     DHEA-Sulfate      3. Stage 2 chronic kidney disease  N18.2       4. Essential hypertension  I10       5. Mixed hyperlipidemia  E78.2       6. B12 deficiency  E53.8       7. MVP (mitral valve prolapse)  I34.1          Assessment & Plan  1. Diabetes Mellitus: well-controlled  - Their diabetes is well-managed with an A1c level of 6%.  - Despite the absence of hypoglycemia, the potential for dizziness and hypoglycemia necessitates a reduction in Janumet dosage to one tablet daily.  - A continuous glucose monitor will be provided for their use, with the understanding that it may not always provide accurate readings during hypoglycemic episodes. They are advised to verify any low blood sugar readings with a fingerstick test.  - They are also advised to monitor their feet daily. If they continue to experience symptoms of low blood sugar, they should discontinue the medication entirely and inform us.    Microvascular screening:  - The patient reports no history of diabetic retinopathy, foot ulcers, neuropathy, or peripheral vascular disease.  - They perform daily foot inspections.  - The patient underwent cataract surgery in March 2025 and has not received laser treatment or injections for their eyes.    2. Dizziness  - They have undergone a complete cardiac evaluation, including a stress test and echocardiogram earlier this year   - Their dizziness could also be attributed to a temporary drop in blood pressure due to dehydration or inadequate fluid intake while on blood pressure medication.  - Cortisol and DHEA-S levels will be checked.  - They are advised to consult with their

## 2025-06-24 ENCOUNTER — PATIENT MESSAGE (OUTPATIENT)
Age: 70
End: 2025-06-24

## 2025-06-24 DIAGNOSIS — E11.65 TYPE 2 DIABETES MELLITUS WITH HYPERGLYCEMIA, WITHOUT LONG-TERM CURRENT USE OF INSULIN (HCC): ICD-10-CM

## 2025-06-24 RX ORDER — SITAGLIPTIN AND METFORMIN HYDROCHLORIDE 1000; 50 MG/1; MG/1
1 TABLET, FILM COATED, EXTENDED RELEASE ORAL DAILY
Qty: 90 TABLET | Refills: 3 | Status: ACTIVE | OUTPATIENT
Start: 2025-06-24

## 2025-06-24 RX ORDER — BLOOD-GLUCOSE METER
EACH MISCELLANEOUS
Qty: 1 KIT | Refills: 0 | Status: SHIPPED | OUTPATIENT
Start: 2025-06-24

## 2025-06-24 RX ORDER — HYDROCHLOROTHIAZIDE 12.5 MG/1
CAPSULE ORAL
Qty: 6 EACH | Refills: 3 | Status: SHIPPED | OUTPATIENT
Start: 2025-06-24

## 2025-06-24 RX ORDER — BLOOD SUGAR DIAGNOSTIC
STRIP MISCELLANEOUS
Qty: 300 EACH | Refills: 3 | Status: SHIPPED | OUTPATIENT
Start: 2025-06-24

## 2025-06-24 RX ORDER — LANCETS 33 GAUGE
EACH MISCELLANEOUS
Qty: 300 EACH | Refills: 3 | Status: SHIPPED | OUTPATIENT
Start: 2025-06-24

## 2025-06-30 ENCOUNTER — TELEMEDICINE (OUTPATIENT)
Dept: PRIMARY CARE CLINIC | Facility: CLINIC | Age: 70
End: 2025-06-30
Payer: MEDICARE

## 2025-06-30 DIAGNOSIS — B54 UNSPECIFIED MALARIA: Primary | ICD-10-CM

## 2025-06-30 PROCEDURE — 1160F RVW MEDS BY RX/DR IN RCRD: CPT | Performed by: NURSE PRACTITIONER

## 2025-06-30 PROCEDURE — 1159F MED LIST DOCD IN RCRD: CPT | Performed by: NURSE PRACTITIONER

## 2025-06-30 PROCEDURE — 99213 OFFICE O/P EST LOW 20 MIN: CPT | Performed by: NURSE PRACTITIONER

## 2025-06-30 PROCEDURE — 1123F ACP DISCUSS/DSCN MKR DOCD: CPT | Performed by: NURSE PRACTITIONER

## 2025-06-30 RX ORDER — ATOVAQUONE AND PROGUANIL HYDROCHLORIDE 250; 100 MG/1; MG/1
1 TABLET, FILM COATED ORAL DAILY
Qty: 30 TABLET | Refills: 0 | Status: SHIPPED | OUTPATIENT
Start: 2025-06-30

## 2025-06-30 NOTE — PROGRESS NOTES
Have you been to the ER, urgent care clinic since your last visit?  Hospitalized since your last visit?   NO    Have you seen or consulted any other health care providers outside our system since your last visit?   NO    Chief Complaint   Patient presents with    Follow-up

## 2025-06-30 NOTE — PROGRESS NOTES
Trice Lim is a 69 y.o. female who was seen via telemedicine today 6/30/2025).    Assessment & Plan:   Below is the assessment and plan developed based on review of pertinent history, physical exam, labs, studies, and medications.    1. Malaria prevention  -     atovaquone-proguanil (MALARONE) 250-100 MG per tablet; Take 1 tablet by mouth daily Take 1 tablet  once daily; start 1 to 2 days prior to entering a malaria-endemic area, continue throughout the stay and for 7 days after returning, Disp-30 tablet, R-0Normal      No follow-ups on file.    Subjective:   Trice was seen today for Follow-up     Patient reports she is traveling to [facility] [place] July 12 for two weeks , She is UTD with yellow fever vaccine.    CDC recommends treating for malaria prevention when traveling to this country.     Review of Systems   All other systems reviewed and are negative.         Objective:     There were no vitals filed for this visit.   There is no height or weight on file to calculate BMI.     Physical Exam  Constitutional:       General: She is not in acute distress.     Appearance: Normal appearance.   HENT:      Head: Normocephalic.   Pulmonary:      Effort: Pulmonary effort is normal.   Neurological:      Mental Status: She is alert and oriented to person, place, and time. Mental status is at baseline.   Psychiatric:         Mood and Affect: Mood normal.         Behavior: Behavior normal.          No Known Allergies    Current Outpatient Medications   Medication Sig Dispense Refill    atovaquone-proguanil (MALARONE) 250-100 MG per tablet Take 1 tablet by mouth daily Take 1 tablet  once daily; start 1 to 2 days prior to entering a malaria-endemic area, continue throughout the stay and for 7 days after returning 30 tablet 0    Blood Glucose Monitoring Suppl (ONETOUCH VERIO FLEX SYSTEM) w/Device KIT Use to check BG 3 times daily. Dx code E11.65 1 kit 0    blood glucose test strips (ONETOUCH VERIO) strip Use to check BG 3 times

## 2025-07-28 ENCOUNTER — LAB (OUTPATIENT)
Age: 70
End: 2025-07-28

## 2025-07-28 DIAGNOSIS — R42 DIZZINESS: ICD-10-CM

## 2025-07-28 LAB — CORTIS AM PEAK SERPL-MCNC: 21.6 UG/DL (ref 4.3–22.45)

## 2025-07-29 LAB — DHEA-S SERPL-MCNC: 261 UG/DL (ref 20.4–186.6)

## 2025-08-04 ENCOUNTER — PATIENT MESSAGE (OUTPATIENT)
Age: 70
End: 2025-08-04

## 2025-08-04 DIAGNOSIS — E11.65 TYPE 2 DIABETES MELLITUS WITH HYPERGLYCEMIA, WITHOUT LONG-TERM CURRENT USE OF INSULIN (HCC): Primary | ICD-10-CM

## 2025-08-05 RX ORDER — BLOOD SUGAR DIAGNOSTIC
STRIP MISCELLANEOUS
Qty: 300 EACH | Refills: 3 | Status: SHIPPED | OUTPATIENT
Start: 2025-08-05

## 2025-08-05 RX ORDER — BLOOD-GLUCOSE METER
EACH MISCELLANEOUS
Qty: 1 KIT | Refills: 1 | Status: SHIPPED | OUTPATIENT
Start: 2025-08-05

## 2025-08-05 RX ORDER — LANCETS
EACH MISCELLANEOUS
Qty: 300 EACH | Refills: 3 | Status: SHIPPED | OUTPATIENT
Start: 2025-08-05

## 2025-08-06 ENCOUNTER — LAB (OUTPATIENT)
Age: 70
End: 2025-08-06

## 2025-08-06 DIAGNOSIS — R79.89 ELEVATED DEHYDROEPIANDROSTERONE SULFATE LEVEL: ICD-10-CM

## 2025-08-10 LAB
TESTOST FREE SERPL-MCNC: 2.8 PG/ML (ref 0–4.2)
TESTOST SERPL-MCNC: 11.8 NG/DL (ref 7–40)

## 2025-08-18 DIAGNOSIS — E11.65 TYPE 2 DIABETES MELLITUS WITH HYPERGLYCEMIA, WITHOUT LONG-TERM CURRENT USE OF INSULIN (HCC): ICD-10-CM

## 2025-08-18 RX ORDER — LANCETS
EACH MISCELLANEOUS
Qty: 200 EACH | Refills: 3 | Status: SHIPPED | OUTPATIENT
Start: 2025-08-18